# Patient Record
Sex: MALE | Race: WHITE | NOT HISPANIC OR LATINO | Employment: FULL TIME | URBAN - METROPOLITAN AREA
[De-identification: names, ages, dates, MRNs, and addresses within clinical notes are randomized per-mention and may not be internally consistent; named-entity substitution may affect disease eponyms.]

---

## 2019-09-17 ENCOUNTER — OFFICE VISIT (OUTPATIENT)
Dept: FAMILY MEDICINE CLINIC | Facility: CLINIC | Age: 54
End: 2019-09-17

## 2019-09-17 VITALS
BODY MASS INDEX: 38.34 KG/M2 | DIASTOLIC BLOOD PRESSURE: 82 MMHG | TEMPERATURE: 98.4 F | HEIGHT: 68 IN | WEIGHT: 253 LBS | SYSTOLIC BLOOD PRESSURE: 130 MMHG | HEART RATE: 61 BPM | OXYGEN SATURATION: 97 %

## 2019-09-17 DIAGNOSIS — I10 ESSENTIAL HYPERTENSION: ICD-10-CM

## 2019-09-17 DIAGNOSIS — E11.8 TYPE 2 DIABETES MELLITUS WITH COMPLICATION, WITHOUT LONG-TERM CURRENT USE OF INSULIN (HCC): Primary | ICD-10-CM

## 2019-09-17 DIAGNOSIS — E78.2 MIXED HYPERLIPIDEMIA: ICD-10-CM

## 2019-09-17 DIAGNOSIS — D72.823 LEUKEMOID REACTION: ICD-10-CM

## 2019-09-17 PROCEDURE — 99214 OFFICE O/P EST MOD 30 MIN: CPT | Performed by: FAMILY MEDICINE

## 2019-09-17 RX ORDER — MELOXICAM 15 MG/1
15 TABLET ORAL DAILY
COMMUNITY
End: 2019-09-17

## 2019-09-17 RX ORDER — PIOGLITAZONEHYDROCHLORIDE 45 MG/1
45 TABLET ORAL DAILY
COMMUNITY
End: 2019-10-07 | Stop reason: SDUPTHER

## 2019-09-17 RX ORDER — SIMVASTATIN 40 MG
40 TABLET ORAL NIGHTLY
COMMUNITY
End: 2020-05-27 | Stop reason: SDUPTHER

## 2019-09-17 RX ORDER — LISINOPRIL 20 MG/1
1 TABLET ORAL DAILY
COMMUNITY
Start: 2019-08-29 | End: 2019-09-17

## 2019-09-17 RX ORDER — LISINOPRIL 10 MG/1
10 TABLET ORAL DAILY
COMMUNITY
End: 2020-01-16

## 2019-09-17 RX ORDER — MELOXICAM 15 MG/1
15 TABLET ORAL DAILY
Refills: 3 | COMMUNITY
Start: 2019-08-08 | End: 2019-11-11 | Stop reason: SDUPTHER

## 2019-09-17 NOTE — PROGRESS NOTES
Chief Complaint   Patient presents with   • Diabetes       Subjective   Raúl Jarquin is a 54 y.o. male.     History of Present Illness   FU HTN.  No orthostasis.  Doing well with meds.    F/U hyperlipidemia.  No myalgias.  Doing well with meds.    F/U DM2.  Seen Optho today.  On meds regualrly.      The following portions of the patient's history were reviewed and updated as appropriate: allergies, current medications, past family history, past medical history, past social history, past surgical history and problem list.    Review of Systems   Constitutional: Negative for appetite change and fatigue.   HENT: Negative for nosebleeds and sore throat.    Eyes: Negative for blurred vision and visual disturbance.   Respiratory: Negative for shortness of breath and wheezing.    Cardiovascular: Negative for chest pain and leg swelling.   Gastrointestinal: Negative for abdominal distention and abdominal pain.   Endocrine: Negative for cold intolerance and polyuria.   Genitourinary: Negative for dysuria and hematuria.   Musculoskeletal: Negative for arthralgias and myalgias.   Skin: Negative for color change and rash.   Neurological: Negative for weakness and confusion.   Psychiatric/Behavioral: Negative for agitation and depressed mood.       Patient Active Problem List   Diagnosis   • Hypertension   • Hyperlipidemia   • Diabetes mellitus (CMS/HCC)   • Carpal tunnel syndrome   • BPH (benign prostatic hyperplasia)   • Arthritis   • Leukemoid reaction       No Known Allergies      Current Outpatient Medications:   •  Cetirizine HCl 10 MG capsule, Take  by mouth., Disp: , Rfl:   •  lisinopril (PRINIVIL,ZESTRIL) 10 MG tablet, Take 10 mg by mouth Daily., Disp: , Rfl:   •  meloxicam (MOBIC) 15 MG tablet, Take 15 mg by mouth Daily., Disp: , Rfl: 3  •  pioglitazone (ACTOS) 45 MG tablet, Take 45 mg by mouth Daily., Disp: , Rfl:   •  simvastatin (ZOCOR) 40 MG tablet, Take 40 mg by mouth Every Night., Disp: , Rfl:     Past Medical  History:   Diagnosis Date   • Acute bronchitis    • Arthritis    • BPH (benign prostatic hyperplasia)    • Carpal tunnel syndrome    • Colon cancer screening    • Diabetes mellitus (CMS/HCC)    • Hyperlipidemia    • Hypertension    • Immunization deficiency    • Urinary urgency        Past Surgical History:   Procedure Laterality Date   • HAND SURGERY Right     Right Thumb surgery        Family History   Problem Relation Age of Onset   • Diabetes Mother    • Hypertension Mother        Social History     Tobacco Use   • Smoking status: Never Smoker   • Smokeless tobacco: Never Used   Substance Use Topics   • Alcohol use: Yes            Objective     Vitals:    09/17/19 1505   BP: 130/82   Pulse: 61   Temp: 98.4 °F (36.9 °C)   SpO2: 97%     Body mass index is 38.47 kg/m².    Physical Exam   Constitutional: He is oriented to person, place, and time. He appears well-developed and well-nourished.   HENT:   Head: Normocephalic and atraumatic.   Right Ear: External ear normal.   Left Ear: External ear normal.   Nose: Nose normal.   Mouth/Throat: Oropharynx is clear and moist.   Eyes: Conjunctivae and EOM are normal. Pupils are equal, round, and reactive to light.   Neck: Normal range of motion. Neck supple. No tracheal deviation present. No thyromegaly present.   Cardiovascular: Normal rate, regular rhythm and normal heart sounds. Exam reveals no gallop and no friction rub.   No murmur heard.  Pulmonary/Chest: Effort normal and breath sounds normal. No respiratory distress. He exhibits no tenderness.   Abdominal: Soft. Bowel sounds are normal. He exhibits no distension. There is no tenderness.   Musculoskeletal: Normal range of motion. He exhibits no edema or tenderness.   Lymphadenopathy:     He has no cervical adenopathy.   Neurological: He is alert and oriented to person, place, and time. He displays normal reflexes. No cranial nerve deficit or sensory deficit. Coordination normal.   Skin: Skin is warm and dry.    Psychiatric: He has a normal mood and affect. His behavior is normal. Judgment and thought content normal.   Nursing note and vitals reviewed.      Lab Results   Component Value Date    BUN 21 (H) 07/13/2019    CREATININE 0.9 07/13/2019    BCR 23.3 07/13/2019    K 4.3 07/13/2019    CO2 29 07/13/2019    CALCIUM 9.3 07/13/2019    ALBUMIN 4.0 07/13/2019    LABIL2 1.2 07/13/2019    AST 20 07/13/2019    ALT 23 07/13/2019       WBC   Date Value Ref Range Status   07/13/2019 18.99 (H) 4.5 - 11.0 10*3/uL Final     RBC   Date Value Ref Range Status   07/13/2019 4.89 4.5 - 5.9 10*6/uL Final     Hemoglobin   Date Value Ref Range Status   07/13/2019 14.0 13.5 - 17.5 g/dL Final     Hematocrit   Date Value Ref Range Status   07/13/2019 44.0 41.0 - 53.0 % Final     MCV   Date Value Ref Range Status   07/13/2019 90.0 80.0 - 100.0 fL Final     MCH   Date Value Ref Range Status   07/13/2019 28.6 26.0 - 34.0 pg Final     MCHC   Date Value Ref Range Status   07/13/2019 31.8 31.0 - 37.0 g/dL Final     RDW   Date Value Ref Range Status   07/13/2019 14.0 12.0 - 16.8 % Final     MPV   Date Value Ref Range Status   07/13/2019 11.0 (H) 6.7 - 10.8 fL Final     Platelets   Date Value Ref Range Status   07/13/2019 219 140 - 440 10*3/uL Final     Neutrophil Rel %   Date Value Ref Range Status   07/13/2019 84.8 (H) 45 - 80 % Final     Lymphocyte Rel %   Date Value Ref Range Status   07/13/2019 6.5 (L) 15 - 50 % Final     Monocyte Rel %   Date Value Ref Range Status   07/13/2019 7.9 0 - 15 % Final     Eosinophil %   Date Value Ref Range Status   07/13/2019 0.3 0 - 7 % Final     Basophil Rel %   Date Value Ref Range Status   07/13/2019 0.1 0 - 2 % Final     Immature Grans %   Date Value Ref Range Status   07/13/2019 0.4 (H) 0 % Final     Neutrophils Absolute   Date Value Ref Range Status   07/13/2019 16.10 (H) 2.0 - 8.8 10*3/uL Final     Lymphocytes Absolute   Date Value Ref Range Status   07/13/2019 1.24 0.7 - 5.5 10*3/uL Final     Monocytes  Absolute   Date Value Ref Range Status   07/13/2019 1.50 0.0 - 1.7 10*3/uL Final     Eosinophils Absolute   Date Value Ref Range Status   07/13/2019 0.06 0.0 - 0.8 10*3/uL Final     Basophils Absolute   Date Value Ref Range Status   07/13/2019 0.02 0.0 - 0.2 10*3/uL Final     Immature Grans, Absolute   Date Value Ref Range Status   07/13/2019 0.07 <1 10*3/uL Final     nRBC   Date Value Ref Range Status   07/13/2019 0 0 /100(WBC) Final       No results found for: HGBA1C    No results found for: CXRKHOKS95    No results found for: TSH    No results found for: CHOL  No results found for: TRIG  No results found for: HDL  No results found for: LDL  No results found for: VLDL  No results found for: LDLHDL      Procedures    Assessment/Plan   Problems Addressed this Visit        Cardiovascular and Mediastinum    Hypertension    Relevant Medications    lisinopril (PRINIVIL,ZESTRIL) 10 MG tablet    Hyperlipidemia    Relevant Medications    simvastatin (ZOCOR) 40 MG tablet    Other Relevant Orders    TSH Rfx On Abnormal To Free T4       Endocrine    Diabetes mellitus (CMS/HCC) - Primary    Relevant Medications    pioglitazone (ACTOS) 45 MG tablet    Other Relevant Orders    Hemoglobin A1c    Comprehensive Metabolic Panel    Lipid Panel With / Chol / HDL Ratio       Immune and Lymphatic    Leukemoid reaction    Relevant Medications    meloxicam (MOBIC) 15 MG tablet    Other Relevant Orders    CBC & Differential          Orders Placed This Encounter   Procedures   • Hemoglobin A1c   • Comprehensive Metabolic Panel   • Lipid Panel With / Chol / HDL Ratio   • TSH Rfx On Abnormal To Free T4   • CBC & Differential     Order Specific Question:   Manual Differential     Answer:   No       Current Outpatient Medications   Medication Sig Dispense Refill   • Cetirizine HCl 10 MG capsule Take  by mouth.     • lisinopril (PRINIVIL,ZESTRIL) 10 MG tablet Take 10 mg by mouth Daily.     • meloxicam (MOBIC) 15 MG tablet Take 15 mg by mouth  Daily.  3   • pioglitazone (ACTOS) 45 MG tablet Take 45 mg by mouth Daily.     • simvastatin (ZOCOR) 40 MG tablet Take 40 mg by mouth Every Night.       No current facility-administered medications for this visit.        Raúl Jarquin had no medications administered during this visit.    Return in about 4 months (around 1/17/2020).    There are no Patient Instructions on file for this visit.

## 2019-09-18 LAB
ALBUMIN SERPL-MCNC: 4.4 G/DL (ref 3.5–5.5)
ALBUMIN/GLOB SERPL: 1.7 {RATIO} (ref 1.2–2.2)
ALP SERPL-CCNC: 80 IU/L (ref 39–117)
ALT SERPL-CCNC: 25 IU/L (ref 0–44)
AST SERPL-CCNC: 21 IU/L (ref 0–40)
BASOPHILS # BLD AUTO: 0 X10E3/UL (ref 0–0.2)
BASOPHILS NFR BLD AUTO: 0 %
BILIRUB SERPL-MCNC: 0.4 MG/DL (ref 0–1.2)
BUN SERPL-MCNC: 16 MG/DL (ref 6–24)
BUN/CREAT SERPL: 21 (ref 9–20)
CALCIUM SERPL-MCNC: 9.6 MG/DL (ref 8.7–10.2)
CHLORIDE SERPL-SCNC: 103 MMOL/L (ref 96–106)
CHOLEST SERPL-MCNC: 195 MG/DL (ref 100–199)
CHOLEST/HDLC SERPL: 3.3 RATIO (ref 0–5)
CO2 SERPL-SCNC: 22 MMOL/L (ref 20–29)
CREAT SERPL-MCNC: 0.77 MG/DL (ref 0.76–1.27)
EOSINOPHIL # BLD AUTO: 0.3 X10E3/UL (ref 0–0.4)
EOSINOPHIL NFR BLD AUTO: 4 %
ERYTHROCYTE [DISTWIDTH] IN BLOOD BY AUTOMATED COUNT: 13.8 % (ref 12.3–15.4)
GLOBULIN SER CALC-MCNC: 2.6 G/DL (ref 1.5–4.5)
GLUCOSE SERPL-MCNC: 99 MG/DL (ref 65–99)
HBA1C MFR BLD: 6.7 % (ref 4.8–5.6)
HCT VFR BLD AUTO: 43.1 % (ref 37.5–51)
HDLC SERPL-MCNC: 59 MG/DL
HGB BLD-MCNC: 14.8 G/DL (ref 13–17.7)
IMM GRANULOCYTES # BLD AUTO: 0 X10E3/UL (ref 0–0.1)
IMM GRANULOCYTES NFR BLD AUTO: 0 %
LDLC SERPL CALC-MCNC: 108 MG/DL (ref 0–99)
LYMPHOCYTES # BLD AUTO: 1.9 X10E3/UL (ref 0.7–3.1)
LYMPHOCYTES NFR BLD AUTO: 27 %
MCH RBC QN AUTO: 29.1 PG (ref 26.6–33)
MCHC RBC AUTO-ENTMCNC: 34.3 G/DL (ref 31.5–35.7)
MCV RBC AUTO: 85 FL (ref 79–97)
MONOCYTES # BLD AUTO: 0.7 X10E3/UL (ref 0.1–0.9)
MONOCYTES NFR BLD AUTO: 10 %
NEUTROPHILS # BLD AUTO: 4.1 X10E3/UL (ref 1.4–7)
NEUTROPHILS NFR BLD AUTO: 59 %
PLATELET # BLD AUTO: 274 X10E3/UL (ref 150–450)
POTASSIUM SERPL-SCNC: 4.9 MMOL/L (ref 3.5–5.2)
PROT SERPL-MCNC: 7 G/DL (ref 6–8.5)
RBC # BLD AUTO: 5.09 X10E6/UL (ref 4.14–5.8)
SODIUM SERPL-SCNC: 142 MMOL/L (ref 134–144)
TRIGL SERPL-MCNC: 141 MG/DL (ref 0–149)
TSH SERPL DL<=0.005 MIU/L-ACNC: 1.47 UIU/ML (ref 0.45–4.5)
VLDLC SERPL CALC-MCNC: 28 MG/DL (ref 5–40)
WBC # BLD AUTO: 7 X10E3/UL (ref 3.4–10.8)

## 2019-10-08 RX ORDER — PIOGLITAZONEHYDROCHLORIDE 45 MG/1
TABLET ORAL
Qty: 90 TABLET | Refills: 3 | Status: SHIPPED | OUTPATIENT
Start: 2019-10-08 | End: 2019-10-21 | Stop reason: SDUPTHER

## 2019-10-18 ENCOUNTER — TELEPHONE (OUTPATIENT)
Dept: FAMILY MEDICINE CLINIC | Facility: CLINIC | Age: 54
End: 2019-10-18

## 2019-10-18 DIAGNOSIS — E11.9 TYPE 2 DIABETES MELLITUS WITHOUT COMPLICATION, WITHOUT LONG-TERM CURRENT USE OF INSULIN (HCC): Primary | ICD-10-CM

## 2019-10-18 NOTE — TELEPHONE ENCOUNTER
[facility] called and states the mail has lost patient's prescription for pioglitazone.  They are sending a replacement, but wants to know if a 7 day supply could be called into [facility], number is [phone].

## 2019-10-21 DIAGNOSIS — E11.9 TYPE 2 DIABETES MELLITUS WITHOUT COMPLICATION, WITHOUT LONG-TERM CURRENT USE OF INSULIN (HCC): ICD-10-CM

## 2019-10-21 RX ORDER — PIOGLITAZONEHYDROCHLORIDE 45 MG/1
45 TABLET ORAL DAILY
Qty: 30 TABLET | Refills: 0 | Status: SHIPPED | OUTPATIENT
Start: 2019-10-21 | End: 2019-10-21 | Stop reason: SDUPTHER

## 2019-10-21 RX ORDER — PIOGLITAZONEHYDROCHLORIDE 45 MG/1
45 TABLET ORAL DAILY
Qty: 90 TABLET | Refills: 2 | Status: SHIPPED | OUTPATIENT
Start: 2019-10-21 | End: 2020-09-21 | Stop reason: SDUPTHER

## 2019-11-11 DIAGNOSIS — M19.90 ARTHRITIS: Primary | ICD-10-CM

## 2019-11-11 RX ORDER — MELOXICAM 15 MG/1
15 TABLET ORAL DAILY
Qty: 90 TABLET | Refills: 3 | Status: SHIPPED | OUTPATIENT
Start: 2019-11-11 | End: 2021-01-08

## 2019-12-09 ENCOUNTER — OFFICE VISIT (OUTPATIENT)
Dept: FAMILY MEDICINE CLINIC | Facility: CLINIC | Age: 54
End: 2019-12-09

## 2019-12-09 VITALS
HEIGHT: 68 IN | DIASTOLIC BLOOD PRESSURE: 90 MMHG | HEART RATE: 76 BPM | OXYGEN SATURATION: 97 % | RESPIRATION RATE: 19 BRPM | WEIGHT: 258 LBS | TEMPERATURE: 98.2 F | SYSTOLIC BLOOD PRESSURE: 138 MMHG | BODY MASS INDEX: 39.1 KG/M2

## 2019-12-09 DIAGNOSIS — R31.9 HEMATURIA, UNSPECIFIED TYPE: ICD-10-CM

## 2019-12-09 DIAGNOSIS — M54.50 ACUTE RIGHT-SIDED LOW BACK PAIN, UNSPECIFIED WHETHER SCIATICA PRESENT: Primary | ICD-10-CM

## 2019-12-09 LAB
BILIRUB BLD-MCNC: NEGATIVE MG/DL
CLARITY, POC: CLEAR
COLOR UR: YELLOW
GLUCOSE UR STRIP-MCNC: NEGATIVE MG/DL
KETONES UR QL: NEGATIVE
LEUKOCYTE EST, POC: NEGATIVE
NITRITE UR-MCNC: NEGATIVE MG/ML
PH UR: 7 [PH] (ref 5–8)
PROT UR STRIP-MCNC: NEGATIVE MG/DL
RBC # UR STRIP: ABNORMAL /UL
SP GR UR: 1.02 (ref 1–1.03)
UROBILINOGEN UR QL: NORMAL

## 2019-12-09 PROCEDURE — 81003 URINALYSIS AUTO W/O SCOPE: CPT | Performed by: FAMILY MEDICINE

## 2019-12-09 PROCEDURE — 99214 OFFICE O/P EST MOD 30 MIN: CPT | Performed by: FAMILY MEDICINE

## 2019-12-09 NOTE — PROGRESS NOTES
Subjective   Raúl Jarquin is a 54 y.o. male.     Chief Complaint   Patient presents with   • Back Pain     started yesterday    • testicles     testicles pain       Low back pain and R testicle pain yesterday, today better, no dysuria, + fever 99F, took ibuprofen over-the-counter and today all the pain is gone, no longer low back pain, no longer with testicle pain         The following portions of the patient's history were reviewed and updated as appropriate: allergies, current medications, past family history, past medical history, past social history, past surgical history and problem list.    Past Medical History:   Diagnosis Date   • Acute bronchitis    • Arthritis    • BPH (benign prostatic hyperplasia)    • Carpal tunnel syndrome    • Colon cancer screening    • Diabetes mellitus (CMS/HCC)    • Hyperlipidemia    • Hypertension    • Immunization deficiency    • Urinary urgency        Past Surgical History:   Procedure Laterality Date   • HAND SURGERY Right     Right Thumb surgery        Family History   Problem Relation Age of Onset   • Diabetes Mother    • Hypertension Mother        Social History     Socioeconomic History   • Marital status:      Spouse name: Not on file   • Number of children: Not on file   • Years of education: Not on file   • Highest education level: Not on file   Tobacco Use   • Smoking status: Never Smoker   • Smokeless tobacco: Never Used   Substance and Sexual Activity   • Alcohol use: Yes   • Drug use: No   • Sexual activity: Defer       Review of Systems   Constitutional: Negative.    HENT: Negative.    Respiratory: Negative.    Cardiovascular: Negative.    Gastrointestinal: Negative.    Genitourinary: Positive for flank pain. Negative for dysuria.   Musculoskeletal: Positive for back pain.   Neurological: Negative.    All other systems reviewed and are negative.      Objective   Vitals:    12/09/19 0946   BP: 138/90   Pulse: 76   Resp: 19   Temp: 98.2 °F (36.8 °C)   SpO2:  97%     Body mass index is 39.24 kg/m².  Physical Exam   Constitutional: He appears well-developed and well-nourished.   Cardiovascular: Normal rate, regular rhythm and normal heart sounds. Exam reveals no gallop and no friction rub.   No murmur heard.  Pulmonary/Chest: Effort normal and breath sounds normal. No stridor. No respiratory distress. He has no wheezes. He has no rales.   Abdominal: Soft. Bowel sounds are normal. He exhibits no distension and no mass. There is no tenderness. There is no rebound and no guarding. No hernia.   Genitourinary: Penis normal.   Genitourinary Comments: Normal testicles no edema, no hernia   Nursing note and vitals reviewed.        Assessment/Plan   Raúl was seen today for back pain and testicles.    Diagnoses and all orders for this visit:    Acute right-sided low back pain, unspecified whether sciatica present  -     POC Urinalysis Dipstick, Automated; Future  -     CT Abdomen Pelvis Without Contrast; Future    Hematuria, unspecified type  -     CT Abdomen Pelvis Without Contrast; Future  -     Urine Culture - Urine, Urine, Clean Catch      Pain resolved, if reoccurs I offered  the patient  an ultrasound of testicles       For now since the testicle pain is resolved patient opted for waiting before doing an ultrasound the testicles, but because he has a large amount of blood in the urine we will going to proceed with a CAT scan of abdomen and pelvis to rule out stones, if the pain reoccurs patient advised to go to the ER

## 2019-12-12 LAB
BACTERIA UR CULT: ABNORMAL
BACTERIA UR CULT: ABNORMAL
OTHER ANTIBIOTIC SUSC ISLT: ABNORMAL

## 2019-12-12 NOTE — PROGRESS NOTES
I called the patient to communicate the results, there is no answer, left a message to call me back, if he wants we can try the  antibiotic, and cancel the CTs of the abdomen scheduled so that will be cheaper and then recheck the urine again in 3 weeks

## 2019-12-12 NOTE — PROGRESS NOTES
The urine culture shows Enterococcus faecalis, a small amount, please ask the patient has any UTI symptoms, if yes I can send him some antibiotic if not we need to repeat the urine again in 3 weeks, the present of these bacteria can explain why he has the blood in the urine

## 2019-12-13 DIAGNOSIS — R31.9 URINARY TRACT INFECTION WITH HEMATURIA, SITE UNSPECIFIED: ICD-10-CM

## 2019-12-13 DIAGNOSIS — R31.9 HEMATURIA, UNSPECIFIED TYPE: Primary | ICD-10-CM

## 2019-12-13 DIAGNOSIS — N39.0 URINARY TRACT INFECTION WITH HEMATURIA, SITE UNSPECIFIED: ICD-10-CM

## 2019-12-13 RX ORDER — NITROFURANTOIN 25; 75 MG/1; MG/1
100 CAPSULE ORAL 2 TIMES DAILY
Qty: 14 CAPSULE | Refills: 0 | Status: SHIPPED | OUTPATIENT
Start: 2019-12-13 | End: 2019-12-16 | Stop reason: SDUPTHER

## 2019-12-16 ENCOUNTER — TELEPHONE (OUTPATIENT)
Dept: FAMILY MEDICINE CLINIC | Facility: CLINIC | Age: 54
End: 2019-12-16

## 2019-12-16 DIAGNOSIS — R31.9 HEMATURIA, UNSPECIFIED TYPE: ICD-10-CM

## 2019-12-16 DIAGNOSIS — R31.9 URINARY TRACT INFECTION WITH HEMATURIA, SITE UNSPECIFIED: ICD-10-CM

## 2019-12-16 DIAGNOSIS — N39.0 URINARY TRACT INFECTION WITH HEMATURIA, SITE UNSPECIFIED: ICD-10-CM

## 2019-12-16 RX ORDER — NITROFURANTOIN 25; 75 MG/1; MG/1
100 CAPSULE ORAL 2 TIMES DAILY
Qty: 14 CAPSULE | Refills: 0 | Status: SHIPPED | OUTPATIENT
Start: 2019-12-16 | End: 2020-03-02

## 2019-12-16 NOTE — TELEPHONE ENCOUNTER
Patient's script for Macrobid 100 mg was sent to [facility] mail order & not his local pharmacy, can this be fixed

## 2019-12-30 ENCOUNTER — TELEPHONE (OUTPATIENT)
Dept: FAMILY MEDICINE CLINIC | Facility: CLINIC | Age: 54
End: 2019-12-30

## 2019-12-30 DIAGNOSIS — R31.9 HEMATURIA, UNSPECIFIED TYPE: Primary | ICD-10-CM

## 2019-12-30 LAB
BILIRUB BLD-MCNC: NEGATIVE MG/DL
CLARITY, POC: CLEAR
COLOR UR: YELLOW
GLUCOSE UR STRIP-MCNC: NEGATIVE MG/DL
KETONES UR QL: NEGATIVE
LEUKOCYTE EST, POC: NEGATIVE
NITRITE UR-MCNC: NEGATIVE MG/ML
PH UR: 6.5 [PH] (ref 5–8)
PROT UR STRIP-MCNC: NEGATIVE MG/DL
RBC # UR STRIP: NEGATIVE /UL
SP GR UR: 1.01 (ref 1–1.03)
UROBILINOGEN UR QL: NORMAL

## 2019-12-30 PROCEDURE — 81003 URINALYSIS AUTO W/O SCOPE: CPT | Performed by: FAMILY MEDICINE

## 2020-01-15 RX ORDER — LISINOPRIL 20 MG/1
TABLET ORAL
Qty: 45 TABLET | Refills: 0 | Status: SHIPPED | OUTPATIENT
Start: 2020-01-15 | End: 2020-01-16 | Stop reason: SDUPTHER

## 2020-01-16 ENCOUNTER — OFFICE VISIT (OUTPATIENT)
Dept: FAMILY MEDICINE CLINIC | Facility: CLINIC | Age: 55
End: 2020-01-16

## 2020-01-16 VITALS
SYSTOLIC BLOOD PRESSURE: 142 MMHG | BODY MASS INDEX: 41.75 KG/M2 | HEIGHT: 67 IN | TEMPERATURE: 98 F | WEIGHT: 266 LBS | HEART RATE: 57 BPM | OXYGEN SATURATION: 95 % | DIASTOLIC BLOOD PRESSURE: 92 MMHG

## 2020-01-16 DIAGNOSIS — E11.9 TYPE 2 DIABETES MELLITUS WITHOUT COMPLICATION, WITHOUT LONG-TERM CURRENT USE OF INSULIN (HCC): ICD-10-CM

## 2020-01-16 DIAGNOSIS — E78.2 MIXED HYPERLIPIDEMIA: ICD-10-CM

## 2020-01-16 DIAGNOSIS — R35.0 BENIGN PROSTATIC HYPERPLASIA WITH URINARY FREQUENCY: ICD-10-CM

## 2020-01-16 DIAGNOSIS — I10 ESSENTIAL HYPERTENSION: Primary | ICD-10-CM

## 2020-01-16 DIAGNOSIS — N40.1 BENIGN PROSTATIC HYPERPLASIA WITH URINARY FREQUENCY: ICD-10-CM

## 2020-01-16 PROCEDURE — 99214 OFFICE O/P EST MOD 30 MIN: CPT | Performed by: FAMILY MEDICINE

## 2020-01-16 RX ORDER — LISINOPRIL 20 MG/1
20 TABLET ORAL DAILY
Qty: 90 TABLET | Refills: 3 | Status: SHIPPED | OUTPATIENT
Start: 2020-01-16 | End: 2020-09-21 | Stop reason: SDUPTHER

## 2020-01-16 RX ORDER — ASCORBIC ACID 500 MG
1000 TABLET ORAL DAILY
COMMUNITY

## 2020-01-16 NOTE — PROGRESS NOTES
Chief Complaint   Patient presents with   • Hypertension   • Diabetes   • Hyperlipidemia       Subjective   Raúl Jarquin is a 54 y.o. male.     History of Present Illness   F/U HTN.  No orthostasis.  On meds regulalry.    F/U DM2.  On pioglitazone for diabetes.    F/U hyperlipidemia.  No myalgias. Doing well with meds.     The following portions of the patient's history were reviewed and updated as appropriate: allergies, current medications, past family history, past medical history, past social history, past surgical history and problem list.    Review of Systems   Constitutional: Negative for appetite change and fatigue.   HENT: Negative for nosebleeds and sore throat.    Eyes: Negative for blurred vision and visual disturbance.   Respiratory: Negative for shortness of breath and wheezing.    Cardiovascular: Negative for chest pain and leg swelling.   Gastrointestinal: Negative for abdominal distention and abdominal pain.   Endocrine: Negative for cold intolerance and polyuria.   Genitourinary: Negative for dysuria and hematuria.   Musculoskeletal: Negative for arthralgias and myalgias.   Skin: Negative for color change and rash.   Neurological: Negative for weakness and confusion.   Psychiatric/Behavioral: Negative for agitation and depressed mood.       Patient Active Problem List   Diagnosis   • Hypertension   • Hyperlipidemia   • Diabetes mellitus (CMS/HCC)   • Carpal tunnel syndrome   • BPH (benign prostatic hyperplasia)   • Arthritis   • Leukemoid reaction   • Acute right-sided low back pain   • Hematuria       No Known Allergies      Current Outpatient Medications:   •  Cetirizine HCl 10 MG capsule, Take  by mouth., Disp: , Rfl:   •  meloxicam (MOBIC) 15 MG tablet, Take 1 tablet by mouth Daily., Disp: 90 tablet, Rfl: 3  •  pioglitazone (ACTOS) 45 MG tablet, Take 1 tablet by mouth Daily., Disp: 90 tablet, Rfl: 2  •  simvastatin (ZOCOR) 40 MG tablet, Take 40 mg by mouth Every Night., Disp: , Rfl:   •   vitamin C (ASCORBIC ACID) 500 MG tablet, Take 1,000 mg by mouth Daily., Disp: , Rfl:   •  lisinopril (PRINIVIL,ZESTRIL) 20 MG tablet, Take 1 tablet by mouth Daily., Disp: 90 tablet, Rfl: 3  •  nitrofurantoin, macrocrystal-monohydrate, (MACROBID) 100 MG capsule, Take 1 capsule by mouth 2 (Two) Times a Day., Disp: 14 capsule, Rfl: 0    Past Medical History:   Diagnosis Date   • Acute bronchitis    • Arthritis    • BPH (benign prostatic hyperplasia)    • Carpal tunnel syndrome    • Colon cancer screening    • Diabetes mellitus (CMS/HCC)    • Hyperlipidemia    • Hypertension    • Immunization deficiency    • Urinary urgency        Past Surgical History:   Procedure Laterality Date   • HAND SURGERY Right     Right Thumb surgery        Family History   Problem Relation Age of Onset   • Diabetes Mother    • Hypertension Mother        Social History     Tobacco Use   • Smoking status: Never Smoker   • Smokeless tobacco: Never Used   Substance Use Topics   • Alcohol use: Yes            Objective     Vitals:    01/16/20 0817   BP: 142/92   Pulse:    Temp:    SpO2:      Body mass index is 41.66 kg/m².    Physical Exam   Constitutional: He appears well-developed and well-nourished.   HENT:   Head: Normocephalic and atraumatic.   Mouth/Throat: Oropharynx is clear and moist.   Eyes: Pupils are equal, round, and reactive to light. No scleral icterus.   Neck: No thyromegaly present.   Cardiovascular: Normal rate and regular rhythm. Exam reveals no gallop and no friction rub.   No murmur heard.  Pulmonary/Chest: Effort normal. No respiratory distress. He has no wheezes. He has no rales. He exhibits no tenderness.   Abdominal: Soft. Bowel sounds are normal. He exhibits no distension. There is no tenderness.   Musculoskeletal: Normal range of motion. He exhibits no edema or deformity.   Lymphadenopathy:     He has no cervical adenopathy.   Neurological: No cranial nerve deficit. He exhibits normal muscle tone.   Skin: Skin is warm and  dry. No rash noted. He is not diaphoretic.   Vitals reviewed.      Lab Results   Component Value Date    BUN 16 09/17/2019    CREATININE 0.77 09/17/2019    EGFRIFNONA 104 09/17/2019    EGFRIFAFRI 121 09/17/2019    BCR 21 (H) 09/17/2019    K 4.9 09/17/2019    CO2 22 09/17/2019    CALCIUM 9.6 09/17/2019    PROTENTOTREF 7.0 09/17/2019    ALBUMIN 4.4 09/17/2019    LABIL2 1.7 09/17/2019    AST 21 09/17/2019    ALT 25 09/17/2019       WBC   Date Value Ref Range Status   09/17/2019 7.0 3.4 - 10.8 x10E3/uL Final   07/13/2019 18.99 (H) 4.5 - 11.0 10*3/uL Final     RBC   Date Value Ref Range Status   09/17/2019 5.09 4.14 - 5.80 x10E6/uL Final   07/13/2019 4.89 4.5 - 5.9 10*6/uL Final     Hemoglobin   Date Value Ref Range Status   09/17/2019 14.8 13.0 - 17.7 g/dL Final   07/13/2019 14.0 13.5 - 17.5 g/dL Final     Hematocrit   Date Value Ref Range Status   09/17/2019 43.1 37.5 - 51.0 % Final   07/13/2019 44.0 41.0 - 53.0 % Final     MCV   Date Value Ref Range Status   09/17/2019 85 79 - 97 fL Final   07/13/2019 90.0 80.0 - 100.0 fL Final     MCH   Date Value Ref Range Status   09/17/2019 29.1 26.6 - 33.0 pg Final   07/13/2019 28.6 26.0 - 34.0 pg Final     MCHC   Date Value Ref Range Status   09/17/2019 34.3 31.5 - 35.7 g/dL Final   07/13/2019 31.8 31.0 - 37.0 g/dL Final     RDW   Date Value Ref Range Status   09/17/2019 13.8 12.3 - 15.4 % Final   07/13/2019 14.0 12.0 - 16.8 % Final     MPV   Date Value Ref Range Status   07/13/2019 11.0 (H) 6.7 - 10.8 fL Final     Platelets   Date Value Ref Range Status   09/17/2019 274 150 - 450 x10E3/uL Final   07/13/2019 219 140 - 440 10*3/uL Final     Neutrophil Rel %   Date Value Ref Range Status   09/17/2019 59 Not Estab. % Final   07/13/2019 84.8 (H) 45 - 80 % Final     Lymphocyte Rel %   Date Value Ref Range Status   09/17/2019 27 Not Estab. % Final   07/13/2019 6.5 (L) 15 - 50 % Final     Monocyte Rel %   Date Value Ref Range Status   09/17/2019 10 Not Estab. % Final   07/13/2019 7.9 0  - 15 % Final     Eosinophil Rel %   Date Value Ref Range Status   09/17/2019 4 Not Estab. % Final     Eosinophil %   Date Value Ref Range Status   07/13/2019 0.3 0 - 7 % Final     Basophil Rel %   Date Value Ref Range Status   09/17/2019 0 Not Estab. % Final   07/13/2019 0.1 0 - 2 % Final     Immature Grans %   Date Value Ref Range Status   07/13/2019 0.4 (H) 0 % Final     Neutrophils Absolute   Date Value Ref Range Status   09/17/2019 4.1 1.4 - 7.0 x10E3/uL Final   07/13/2019 16.10 (H) 2.0 - 8.8 10*3/uL Final     Lymphocytes Absolute   Date Value Ref Range Status   09/17/2019 1.9 0.7 - 3.1 x10E3/uL Final   07/13/2019 1.24 0.7 - 5.5 10*3/uL Final     Monocytes Absolute   Date Value Ref Range Status   09/17/2019 0.7 0.1 - 0.9 x10E3/uL Final   07/13/2019 1.50 0.0 - 1.7 10*3/uL Final     Eosinophils Absolute   Date Value Ref Range Status   09/17/2019 0.3 0.0 - 0.4 x10E3/uL Final   07/13/2019 0.06 0.0 - 0.8 10*3/uL Final     Basophils Absolute   Date Value Ref Range Status   09/17/2019 0.0 0.0 - 0.2 x10E3/uL Final   07/13/2019 0.02 0.0 - 0.2 10*3/uL Final     Immature Grans, Absolute   Date Value Ref Range Status   07/13/2019 0.07 <1 10*3/uL Final     nRBC   Date Value Ref Range Status   07/13/2019 0 0 /100(WBC) Final       Lab Results   Component Value Date    HGBA1C 6.7 (H) 09/17/2019       No results found for: GAHEYXNA21    TSH   Date Value Ref Range Status   09/17/2019 1.470 0.450 - 4.500 uIU/mL Final       No results found for: CHOL  Lab Results   Component Value Date    TRIG 141 09/17/2019     Lab Results   Component Value Date    HDL 59 09/17/2019     Lab Results   Component Value Date     (H) 09/17/2019     Lab Results   Component Value Date    VLDL 28 09/17/2019     No results found for: LDLHDL      Procedures    Assessment/Plan   Problems Addressed this Visit        Cardiovascular and Mediastinum    Hypertension - Primary    Relevant Medications    lisinopril (PRINIVIL,ZESTRIL) 20 MG tablet     Hyperlipidemia    Relevant Orders    Comprehensive Metabolic Panel       Endocrine    Diabetes mellitus (CMS/HCC)    Relevant Orders    Lipid Panel With / Chol / HDL Ratio    Hemoglobin A1c       Genitourinary    BPH (benign prostatic hyperplasia)    Relevant Orders    PSA DIAGNOSTIC        Uncontrolled HTN.  Increase lisinopril to 20 once a day.    Orders Placed This Encounter   Procedures   • Comprehensive Metabolic Panel   • Lipid Panel With / Chol / HDL Ratio   • Hemoglobin A1c   • PSA DIAGNOSTIC       Current Outpatient Medications   Medication Sig Dispense Refill   • Cetirizine HCl 10 MG capsule Take  by mouth.     • meloxicam (MOBIC) 15 MG tablet Take 1 tablet by mouth Daily. 90 tablet 3   • pioglitazone (ACTOS) 45 MG tablet Take 1 tablet by mouth Daily. 90 tablet 2   • simvastatin (ZOCOR) 40 MG tablet Take 40 mg by mouth Every Night.     • vitamin C (ASCORBIC ACID) 500 MG tablet Take 1,000 mg by mouth Daily.     • lisinopril (PRINIVIL,ZESTRIL) 20 MG tablet Take 1 tablet by mouth Daily. 90 tablet 3   • nitrofurantoin, macrocrystal-monohydrate, (MACROBID) 100 MG capsule Take 1 capsule by mouth 2 (Two) Times a Day. 14 capsule 0     No current facility-administered medications for this visit.        Raúl Britton had no medications administered during this visit.    Return in about 4 months (around 5/16/2020).    There are no Patient Instructions on file for this visit.  Pt declines flu shot.

## 2020-01-17 LAB
ALBUMIN SERPL-MCNC: 4.5 G/DL (ref 3.5–5.2)
ALBUMIN/GLOB SERPL: 1.7 G/DL
ALP SERPL-CCNC: 75 U/L (ref 39–117)
ALT SERPL-CCNC: 20 U/L (ref 1–41)
AST SERPL-CCNC: 17 U/L (ref 1–40)
BILIRUB SERPL-MCNC: 0.5 MG/DL (ref 0.2–1.2)
BUN SERPL-MCNC: 19 MG/DL (ref 6–20)
BUN/CREAT SERPL: 20.9 (ref 7–25)
CALCIUM SERPL-MCNC: 9.5 MG/DL (ref 8.6–10.5)
CHLORIDE SERPL-SCNC: 98 MMOL/L (ref 98–107)
CHOLEST SERPL-MCNC: 196 MG/DL (ref 0–200)
CHOLEST/HDLC SERPL: 2.84 {RATIO}
CO2 SERPL-SCNC: 28.9 MMOL/L (ref 22–29)
CREAT SERPL-MCNC: 0.91 MG/DL (ref 0.76–1.27)
GLOBULIN SER CALC-MCNC: 2.6 GM/DL
GLUCOSE SERPL-MCNC: 123 MG/DL (ref 65–99)
HBA1C MFR BLD: 6.9 % (ref 4.8–5.6)
HDLC SERPL-MCNC: 69 MG/DL (ref 40–60)
LDLC SERPL CALC-MCNC: 105 MG/DL (ref 0–100)
POTASSIUM SERPL-SCNC: 4.9 MMOL/L (ref 3.5–5.2)
PROT SERPL-MCNC: 7.1 G/DL (ref 6–8.5)
PSA SERPL-MCNC: 0.63 NG/ML (ref 0–4)
SODIUM SERPL-SCNC: 139 MMOL/L (ref 136–145)
TRIGL SERPL-MCNC: 112 MG/DL (ref 0–150)
VLDLC SERPL CALC-MCNC: 22.4 MG/DL

## 2020-03-02 ENCOUNTER — OFFICE VISIT (OUTPATIENT)
Dept: FAMILY MEDICINE CLINIC | Facility: CLINIC | Age: 55
End: 2020-03-02

## 2020-03-02 VITALS
BODY MASS INDEX: 40.81 KG/M2 | RESPIRATION RATE: 14 BRPM | OXYGEN SATURATION: 97 % | HEART RATE: 96 BPM | DIASTOLIC BLOOD PRESSURE: 88 MMHG | HEIGHT: 67 IN | SYSTOLIC BLOOD PRESSURE: 128 MMHG | WEIGHT: 260 LBS | TEMPERATURE: 98.3 F

## 2020-03-02 DIAGNOSIS — R31.9 HEMATURIA, UNSPECIFIED TYPE: ICD-10-CM

## 2020-03-02 DIAGNOSIS — J02.9 SORE THROAT: Primary | ICD-10-CM

## 2020-03-02 LAB
BILIRUB BLD-MCNC: NEGATIVE MG/DL
CLARITY, POC: ABNORMAL
COLOR UR: ABNORMAL
EXPIRATION DATE: NORMAL
EXPIRATION DATE: NORMAL
FLUAV AG NPH QL: NEGATIVE
FLUBV AG NPH QL: NEGATIVE
GLUCOSE UR STRIP-MCNC: NEGATIVE MG/DL
INTERNAL CONTROL: NORMAL
INTERNAL CONTROL: NORMAL
KETONES UR QL: NEGATIVE
LEUKOCYTE EST, POC: NEGATIVE
Lab: NORMAL
Lab: NORMAL
NITRITE UR-MCNC: NEGATIVE MG/ML
PH UR: 6.5 [PH] (ref 5–8)
PROT UR STRIP-MCNC: ABNORMAL MG/DL
RBC # UR STRIP: ABNORMAL /UL
S PYO AG THROAT QL: NEGATIVE
SP GR UR: 1.02 (ref 1–1.03)
UROBILINOGEN UR QL: NORMAL

## 2020-03-02 PROCEDURE — 81003 URINALYSIS AUTO W/O SCOPE: CPT | Performed by: FAMILY MEDICINE

## 2020-03-02 PROCEDURE — 99213 OFFICE O/P EST LOW 20 MIN: CPT | Performed by: FAMILY MEDICINE

## 2020-03-02 PROCEDURE — 87880 STREP A ASSAY W/OPTIC: CPT | Performed by: FAMILY MEDICINE

## 2020-03-02 PROCEDURE — 87804 INFLUENZA ASSAY W/OPTIC: CPT | Performed by: FAMILY MEDICINE

## 2020-03-02 RX ORDER — AMOXICILLIN 875 MG/1
875 TABLET, COATED ORAL 2 TIMES DAILY
Qty: 20 TABLET | Refills: 0 | Status: SHIPPED | OUTPATIENT
Start: 2020-03-02 | End: 2020-03-12

## 2020-03-02 RX ORDER — AMOXICILLIN 875 MG/1
875 TABLET, COATED ORAL 2 TIMES DAILY
Qty: 20 TABLET | Refills: 0 | Status: SHIPPED | OUTPATIENT
Start: 2020-03-02 | End: 2020-03-02

## 2020-03-02 NOTE — PROGRESS NOTES
Subjective   Raúl Jarquin is a 54 y.o. male.     Chief Complaint   Patient presents with   • Fatigue   • Sore Throat   • Blood in Urine       Blood in urine x a day, no pain, no fever, sore throat x a day, dry  cough, wife with strep, body aching, no vomiting no diarrhea positive fatigue, no chest pain or shortness of breath, no abdominal pain, he did not get his flu shot, non-smoker,         The following portions of the patient's history were reviewed and updated as appropriate: allergies, current medications, past family history, past medical history, past social history, past surgical history and problem list.    Past Medical History:   Diagnosis Date   • Acute bronchitis    • Arthritis    • BPH (benign prostatic hyperplasia)    • Carpal tunnel syndrome    • Colon cancer screening    • Diabetes mellitus (CMS/HCC)    • Hyperlipidemia    • Hypertension    • Immunization deficiency    • Urinary urgency        Past Surgical History:   Procedure Laterality Date   • HAND SURGERY Right     Right Thumb surgery        Family History   Problem Relation Age of Onset   • Diabetes Mother    • Hypertension Mother        Social History     Socioeconomic History   • Marital status:      Spouse name: Not on file   • Number of children: Not on file   • Years of education: Not on file   • Highest education level: Not on file   Tobacco Use   • Smoking status: Never Smoker   • Smokeless tobacco: Never Used   Substance and Sexual Activity   • Alcohol use: Yes   • Drug use: No   • Sexual activity: Defer       Review of Systems   Constitutional: Positive for fatigue. Negative for fever.   HENT: Positive for sore throat. Negative for congestion, ear pain, rhinorrhea, sinus pressure, sneezing, swollen glands and trouble swallowing.    Respiratory: Positive for cough. Negative for chest tightness, shortness of breath and wheezing.    Cardiovascular: Negative.    Gastrointestinal: Negative.  Negative for nausea and vomiting.    Genitourinary: Positive for hematuria. Negative for flank pain.   Musculoskeletal: Negative.  Negative for myalgias and neck pain.   Neurological: Negative.    Hematological: Negative.    Psychiatric/Behavioral: Negative.        Objective   Vitals:    03/02/20 1613   BP: 128/88   Pulse: 96   Resp: 14   Temp: 98.3 °F (36.8 °C)   SpO2: 97%     Body mass index is 40.71 kg/m².  Physical Exam   Constitutional: He is oriented to person, place, and time. He appears well-developed and well-nourished.   HENT:   Head: Normocephalic and atraumatic.   Right Ear: External ear normal.   Left Ear: External ear normal.   Nose: Nose normal.   Mouth/Throat: Oropharynx is clear and moist.   Eyes: Pupils are equal, round, and reactive to light. Conjunctivae and EOM are normal.   Neck: Normal range of motion. Neck supple. No tracheal deviation present. No thyromegaly present.   Cardiovascular: Normal rate, regular rhythm and normal heart sounds. Exam reveals no gallop and no friction rub.   No murmur heard.  Pulmonary/Chest: Effort normal and breath sounds normal. No stridor. No respiratory distress. He has no wheezes. He has no rales. He exhibits no tenderness.   Abdominal: Soft. Bowel sounds are normal. He exhibits no distension and no mass. There is no tenderness. There is no rebound and no guarding. No hernia.   Musculoskeletal: Normal range of motion. He exhibits no edema or tenderness.   Lymphadenopathy:     He has no cervical adenopathy.   Neurological: He is alert and oriented to person, place, and time. He displays normal reflexes. No cranial nerve deficit or sensory deficit. Coordination normal.   Skin: Skin is warm and dry.   Psychiatric: He has a normal mood and affect. His behavior is normal. Judgment and thought content normal.   Nursing note and vitals reviewed.        Assessment/Plan   Raúl was seen today for fatigue, sore throat and blood in urine.    Diagnoses and all orders for this visit:    Sore throat  -      POC Rapid Strep A  -     POC Influenza A / B  -     Discontinue: amoxicillin (AMOXIL) 875 MG tablet; Take 1 tablet by mouth 2 (Two) Times a Day for 10 days.  -     amoxicillin (AMOXIL) 875 MG tablet; Take 1 tablet by mouth 2 (Two) Times a Day for 10 days.    Hematuria, unspecified type  -     POC Urinalysis Dipstick, Automated  -     Ambulatory Referral to Urology  -     Urine Culture - Urine, Urine, Clean Catch

## 2020-03-04 LAB
BACTERIA UR CULT: NORMAL
BACTERIA UR CULT: NORMAL

## 2020-04-28 PROCEDURE — 82365 CALCULUS SPECTROSCOPY: CPT | Performed by: UROLOGY

## 2020-04-29 ENCOUNTER — LAB REQUISITION (OUTPATIENT)
Dept: LAB | Facility: HOSPITAL | Age: 55
End: 2020-04-29

## 2020-04-29 DIAGNOSIS — N20.1 CALCULUS OF URETER: ICD-10-CM

## 2020-05-06 LAB
COD CRY STONE QL IR: 70 %
COLOR STONE: NORMAL
COM CRY STONE QL IR: 30 %
COMPN STONE: NORMAL
LABORATORY COMMENT REPORT: NORMAL
Lab: NORMAL
Lab: NORMAL
PHOTO: NORMAL
SIZE STONE: NORMAL MM
SPECIMEN SOURCE: NORMAL
WT STONE: 16 MG

## 2020-05-27 ENCOUNTER — OFFICE VISIT (OUTPATIENT)
Dept: FAMILY MEDICINE CLINIC | Facility: CLINIC | Age: 55
End: 2020-05-27

## 2020-05-27 VITALS
BODY MASS INDEX: 38.43 KG/M2 | SYSTOLIC BLOOD PRESSURE: 122 MMHG | HEART RATE: 75 BPM | OXYGEN SATURATION: 98 % | TEMPERATURE: 97.5 F | DIASTOLIC BLOOD PRESSURE: 74 MMHG | HEIGHT: 68 IN | WEIGHT: 253.6 LBS

## 2020-05-27 DIAGNOSIS — E11.9 TYPE 2 DIABETES MELLITUS WITHOUT COMPLICATION, WITHOUT LONG-TERM CURRENT USE OF INSULIN (HCC): ICD-10-CM

## 2020-05-27 DIAGNOSIS — I10 ESSENTIAL HYPERTENSION: Primary | ICD-10-CM

## 2020-05-27 DIAGNOSIS — E78.2 MIXED HYPERLIPIDEMIA: ICD-10-CM

## 2020-05-27 PROBLEM — N20.0 NEPHROLITHIASIS: Status: ACTIVE | Noted: 2020-05-27

## 2020-05-27 PROCEDURE — 99214 OFFICE O/P EST MOD 30 MIN: CPT | Performed by: FAMILY MEDICINE

## 2020-05-27 RX ORDER — SIMVASTATIN 40 MG
40 TABLET ORAL NIGHTLY
Qty: 90 TABLET | Refills: 3 | Status: SHIPPED | OUTPATIENT
Start: 2020-05-27 | End: 2021-01-18 | Stop reason: SDUPTHER

## 2020-05-27 NOTE — PROGRESS NOTES
Chief Complaint   Patient presents with   • Hypertension   • Diabetes       Subjective   Raúl Jarquin is a 55 y.o. male.     History of Present Illness   FU HTN.  No orhtostasis.  Doing wlel with meds.   F/U DM2.  oN meds regulalry.  No SE.   F/U hyperlipidemia.  No myalgias.  On meds regulalry.      The following portions of the patient's history were reviewed and updated as appropriate: allergies, current medications, past family history, past medical history, past social history, past surgical history and problem list.    Review of Systems   Constitutional: Negative for appetite change and fatigue.   HENT: Negative for nosebleeds and sore throat.    Eyes: Negative for blurred vision and visual disturbance.   Respiratory: Negative for shortness of breath and wheezing.    Cardiovascular: Negative for chest pain and leg swelling.   Gastrointestinal: Negative for abdominal distention and abdominal pain.   Endocrine: Negative for cold intolerance and polyuria.   Genitourinary: Negative for dysuria and hematuria.   Musculoskeletal: Negative for arthralgias and myalgias.   Skin: Negative for color change and rash.   Neurological: Negative for weakness and confusion.   Psychiatric/Behavioral: Negative for agitation and depressed mood.       Patient Active Problem List   Diagnosis   • Hypertension   • Hyperlipidemia   • Diabetes mellitus (CMS/HCC)   • Carpal tunnel syndrome   • BPH (benign prostatic hyperplasia)   • Arthritis   • Leukemoid reaction   • Acute right-sided low back pain   • Hematuria   • Sore throat   • Nephrolithiasis       No Known Allergies      Current Outpatient Medications:   •  Cetirizine HCl 10 MG capsule, Take  by mouth., Disp: , Rfl:   •  lisinopril (PRINIVIL,ZESTRIL) 20 MG tablet, Take 1 tablet by mouth Daily., Disp: 90 tablet, Rfl: 3  •  meloxicam (MOBIC) 15 MG tablet, Take 1 tablet by mouth Daily., Disp: 90 tablet, Rfl: 3  •  pioglitazone (ACTOS) 45 MG tablet, Take 1 tablet by mouth Daily.,  Disp: 90 tablet, Rfl: 2  •  simvastatin (ZOCOR) 40 MG tablet, Take 1 tablet by mouth Every Night., Disp: 90 tablet, Rfl: 3  •  vitamin C (ASCORBIC ACID) 500 MG tablet, Take 1,000 mg by mouth Daily., Disp: , Rfl:     Past Medical History:   Diagnosis Date   • Acute bronchitis    • Arthritis    • BPH (benign prostatic hyperplasia)    • Carpal tunnel syndrome    • Colon cancer screening    • Diabetes mellitus (CMS/HCC)    • Hyperlipidemia    • Hypertension    • Immunization deficiency    • Urinary urgency        Past Surgical History:   Procedure Laterality Date   • HAND SURGERY Right     Right Thumb surgery        Family History   Problem Relation Age of Onset   • Diabetes Mother    • Hypertension Mother        Social History     Tobacco Use   • Smoking status: Never Smoker   • Smokeless tobacco: Never Used   Substance Use Topics   • Alcohol use: Yes            Objective     Vitals:    05/27/20 1120   BP: 122/74   Pulse: 75   Temp: 97.5 °F (36.4 °C)   SpO2: 98%     Body mass index is 38.56 kg/m².    Physical Exam   Constitutional: He appears well-developed and well-nourished.   HENT:   Head: Normocephalic and atraumatic.   Mouth/Throat: Oropharynx is clear and moist.   Eyes: Pupils are equal, round, and reactive to light. No scleral icterus.   Neck: No thyromegaly present.   Cardiovascular: Normal rate and regular rhythm. Exam reveals no gallop and no friction rub.   No murmur heard.  Pulmonary/Chest: Effort normal. No respiratory distress. He has no wheezes. He has no rales. He exhibits no tenderness.   Abdominal: Soft. Bowel sounds are normal. He exhibits no distension. There is no tenderness.   Musculoskeletal: Normal range of motion. He exhibits no edema or deformity.   Lymphadenopathy:     He has no cervical adenopathy.   Neurological: No cranial nerve deficit. He exhibits normal muscle tone.   Skin: Skin is warm and dry. No rash noted. He is not diaphoretic.   Vitals reviewed.      Lab Results   Component Value  Date    BUN 15 04/23/2020    CREATININE 0.7 04/23/2020    EGFRIFNONA 87 01/16/2020    EGFRIFAFRI 106 01/16/2020    BCR 21.4 04/23/2020    K 5.2 (H) 04/23/2020    CO2 31 (H) 04/23/2020    CALCIUM 9.5 04/23/2020    PROTENTOTREF 7.1 01/16/2020    ALBUMIN 4.50 01/16/2020    LABIL2 1.7 01/16/2020    AST 17 01/16/2020    ALT 20 01/16/2020       WBC   Date Value Ref Range Status   09/17/2019 7.0 3.4 - 10.8 x10E3/uL Final   07/13/2019 18.99 (H) 4.5 - 11.0 10*3/uL Final     RBC   Date Value Ref Range Status   09/17/2019 5.09 4.14 - 5.80 x10E6/uL Final   07/13/2019 4.89 4.5 - 5.9 10*6/uL Final     Hemoglobin   Date Value Ref Range Status   09/17/2019 14.8 13.0 - 17.7 g/dL Final   07/13/2019 14.0 13.5 - 17.5 g/dL Final     Hematocrit   Date Value Ref Range Status   09/17/2019 43.1 37.5 - 51.0 % Final   07/13/2019 44.0 41.0 - 53.0 % Final     MCV   Date Value Ref Range Status   09/17/2019 85 79 - 97 fL Final   07/13/2019 90.0 80.0 - 100.0 fL Final     MCH   Date Value Ref Range Status   09/17/2019 29.1 26.6 - 33.0 pg Final   07/13/2019 28.6 26.0 - 34.0 pg Final     MCHC   Date Value Ref Range Status   09/17/2019 34.3 31.5 - 35.7 g/dL Final   07/13/2019 31.8 31.0 - 37.0 g/dL Final     RDW   Date Value Ref Range Status   09/17/2019 13.8 12.3 - 15.4 % Final   07/13/2019 14.0 12.0 - 16.8 % Final     MPV   Date Value Ref Range Status   07/13/2019 11.0 (H) 6.7 - 10.8 fL Final     Platelets   Date Value Ref Range Status   09/17/2019 274 150 - 450 x10E3/uL Final   07/13/2019 219 140 - 440 10*3/uL Final     Neutrophil Rel %   Date Value Ref Range Status   09/17/2019 59 Not Estab. % Final   07/13/2019 84.8 (H) 45 - 80 % Final     Lymphocyte Rel %   Date Value Ref Range Status   09/17/2019 27 Not Estab. % Final   07/13/2019 6.5 (L) 15 - 50 % Final     Monocyte Rel %   Date Value Ref Range Status   09/17/2019 10 Not Estab. % Final   07/13/2019 7.9 0 - 15 % Final     Eosinophil Rel %   Date Value Ref Range Status   09/17/2019 4 Not Estab. %  Final     Eosinophil %   Date Value Ref Range Status   07/13/2019 0.3 0 - 7 % Final     Basophil Rel %   Date Value Ref Range Status   09/17/2019 0 Not Estab. % Final   07/13/2019 0.1 0 - 2 % Final     Immature Grans %   Date Value Ref Range Status   07/13/2019 0.4 (H) 0 % Final     Neutrophils Absolute   Date Value Ref Range Status   09/17/2019 4.1 1.4 - 7.0 x10E3/uL Final   07/13/2019 16.10 (H) 2.0 - 8.8 10*3/uL Final     Lymphocytes Absolute   Date Value Ref Range Status   09/17/2019 1.9 0.7 - 3.1 x10E3/uL Final   07/13/2019 1.24 0.7 - 5.5 10*3/uL Final     Monocytes Absolute   Date Value Ref Range Status   09/17/2019 0.7 0.1 - 0.9 x10E3/uL Final   07/13/2019 1.50 0.0 - 1.7 10*3/uL Final     Eosinophils Absolute   Date Value Ref Range Status   09/17/2019 0.3 0.0 - 0.4 x10E3/uL Final   07/13/2019 0.06 0.0 - 0.8 10*3/uL Final     Basophils Absolute   Date Value Ref Range Status   09/17/2019 0.0 0.0 - 0.2 x10E3/uL Final   07/13/2019 0.02 0.0 - 0.2 10*3/uL Final     Immature Grans, Absolute   Date Value Ref Range Status   07/13/2019 0.07 <1 10*3/uL Final     nRBC   Date Value Ref Range Status   07/13/2019 0 0 /100(WBC) Final       Lab Results   Component Value Date    HGBA1C 6.90 (H) 01/16/2020       No results found for: [id number]    TSH   Date Value Ref Range Status   09/17/2019 1.470 0.450 - 4.500 uIU/mL Final       No results found for: CHOL  Lab Results   Component Value Date    TRIG 112 01/16/2020     Lab Results   Component Value Date    HDL 69 (H) 01/16/2020     Lab Results   Component Value Date     (H) 01/16/2020     Lab Results   Component Value Date    VLDL 22.4 01/16/2020     No results found for: LDLHDL      Procedures    Assessment/Plan   Problems Addressed this Visit        Cardiovascular and Mediastinum    Hypertension - Primary    Relevant Orders    Comprehensive Metabolic Panel    Hyperlipidemia    Relevant Medications    simvastatin (ZOCOR) 40 MG tablet    Other Relevant Orders    Lipid  Panel With / Chol / HDL Ratio       Endocrine    Diabetes mellitus (CMS/[facility])    Relevant Orders    Hemoglobin A1c          Orders Placed This Encounter   Procedures   • Comprehensive Metabolic Panel   • Lipid Panel With / Chol / HDL Ratio   • Hemoglobin A1c       Current Outpatient Medications   Medication Sig Dispense Refill   • Cetirizine HCl 10 MG capsule Take  by mouth.     • lisinopril (PRINIVIL,ZESTRIL) 20 MG tablet Take 1 tablet by mouth Daily. 90 tablet 3   • meloxicam (MOBIC) 15 MG tablet Take 1 tablet by mouth Daily. 90 tablet 3   • pioglitazone (ACTOS) 45 MG tablet Take 1 tablet by mouth Daily. 90 tablet 2   • simvastatin (ZOCOR) 40 MG tablet Take 1 tablet by mouth Every Night. 90 tablet 3   • vitamin C (ASCORBIC ACID) 500 MG tablet Take 1,000 mg by mouth Daily.       No current facility-administered medications for this visit.        Raúl Jarquin had no medications administered during this visit.    Return in about 4 months (around 9/27/2020).    There are no Patient Instructions on file for this visit.

## 2020-05-28 LAB
ALBUMIN SERPL-MCNC: 4.2 G/DL (ref 3.5–5.2)
ALBUMIN/GLOB SERPL: 1.8 G/DL
ALP SERPL-CCNC: 67 U/L (ref 39–117)
ALT SERPL-CCNC: 21 U/L (ref 1–41)
AST SERPL-CCNC: 14 U/L (ref 1–40)
BILIRUB SERPL-MCNC: 0.4 MG/DL (ref 0.2–1.2)
BUN SERPL-MCNC: 18 MG/DL (ref 6–20)
BUN/CREAT SERPL: 22.2 (ref 7–25)
CALCIUM SERPL-MCNC: 9.3 MG/DL (ref 8.6–10.5)
CHLORIDE SERPL-SCNC: 105 MMOL/L (ref 98–107)
CHOLEST SERPL-MCNC: 177 MG/DL (ref 0–200)
CHOLEST/HDLC SERPL: 3.11 {RATIO}
CO2 SERPL-SCNC: 27.6 MMOL/L (ref 22–29)
CREAT SERPL-MCNC: 0.81 MG/DL (ref 0.76–1.27)
GLOBULIN SER CALC-MCNC: 2.4 GM/DL
GLUCOSE SERPL-MCNC: 117 MG/DL (ref 65–99)
HBA1C MFR BLD: 6.78 % (ref 4.8–5.6)
HDLC SERPL-MCNC: 57 MG/DL (ref 40–60)
LDLC SERPL CALC-MCNC: 97 MG/DL (ref 0–100)
POTASSIUM SERPL-SCNC: 4.8 MMOL/L (ref 3.5–5.2)
PROT SERPL-MCNC: 6.6 G/DL (ref 6–8.5)
SODIUM SERPL-SCNC: 141 MMOL/L (ref 136–145)
TRIGL SERPL-MCNC: 116 MG/DL (ref 0–150)
VLDLC SERPL CALC-MCNC: 23.2 MG/DL

## 2020-09-21 ENCOUNTER — OFFICE VISIT (OUTPATIENT)
Dept: FAMILY MEDICINE CLINIC | Facility: CLINIC | Age: 55
End: 2020-09-21

## 2020-09-21 VITALS
TEMPERATURE: 98.7 F | OXYGEN SATURATION: 98 % | HEIGHT: 68 IN | WEIGHT: 251 LBS | HEART RATE: 59 BPM | SYSTOLIC BLOOD PRESSURE: 136 MMHG | DIASTOLIC BLOOD PRESSURE: 86 MMHG | BODY MASS INDEX: 38.04 KG/M2

## 2020-09-21 DIAGNOSIS — Z00.00 ENCOUNTER FOR ANNUAL HEALTH EXAMINATION: Primary | ICD-10-CM

## 2020-09-21 DIAGNOSIS — E11.9 TYPE 2 DIABETES MELLITUS WITHOUT COMPLICATION, WITHOUT LONG-TERM CURRENT USE OF INSULIN (HCC): ICD-10-CM

## 2020-09-21 DIAGNOSIS — I10 ESSENTIAL HYPERTENSION: ICD-10-CM

## 2020-09-21 DIAGNOSIS — E78.2 MIXED HYPERLIPIDEMIA: ICD-10-CM

## 2020-09-21 PROCEDURE — 99396 PREV VISIT EST AGE 40-64: CPT | Performed by: FAMILY MEDICINE

## 2020-09-21 RX ORDER — PIOGLITAZONEHYDROCHLORIDE 45 MG/1
45 TABLET ORAL DAILY
Qty: 90 TABLET | Refills: 2 | Status: SHIPPED | OUTPATIENT
Start: 2020-09-21 | End: 2021-09-08 | Stop reason: SDUPTHER

## 2020-09-21 RX ORDER — LISINOPRIL 20 MG/1
20 TABLET ORAL DAILY
Qty: 90 TABLET | Refills: 3 | Status: SHIPPED | OUTPATIENT
Start: 2020-09-21 | End: 2021-05-24 | Stop reason: SDUPTHER

## 2020-09-21 NOTE — PROGRESS NOTES
Patient here for annual physical exam    Subjective   Raúl Jarquin is a 55 y.o. male.     History of Present Illness   F/U HTN.  No orhtostasis.  Doing well with meds.    F/U hyperlipidmeia.  No myalgias.  LDL normal 3 months ago.    fU DM2.  Doing well with meds.  No SE.  On meds regulalry.        The following portions of the patient's history were reviewed and updated as appropriate: allergies, current medications, past family history, past medical history, past social history, past surgical history and problem list    Last colonoscopy:2019  Optometry:  Seeing today.    Last PSA(if applicable):1/8/20.    Last mammo(if applicable):NA    Immunization History   Administered Date(s) Administered   • Hepatitis A 05/18/2018, 12/06/2018, 07/17/2019   • Pneumococcal Polysaccharide (PPSV23) 11/14/2009       Review of Systems   Constitutional: Negative for appetite change and fatigue.   HENT: Negative for nosebleeds and sore throat.    Eyes: Negative for blurred vision and visual disturbance.   Respiratory: Negative for shortness of breath and wheezing.    Cardiovascular: Negative for chest pain and leg swelling.   Gastrointestinal: Negative for abdominal distention and abdominal pain.   Endocrine: Negative for cold intolerance and polyuria.   Genitourinary: Negative for dysuria and hematuria.   Musculoskeletal: Negative for arthralgias and myalgias.   Skin: Negative for color change and rash.   Neurological: Negative for weakness and confusion.   Psychiatric/Behavioral: Negative for agitation and depressed mood.       Patient Active Problem List   Diagnosis   • Hypertension   • Hyperlipidemia   • Diabetes mellitus (CMS/HCC)   • Carpal tunnel syndrome   • BPH (benign prostatic hyperplasia)   • Arthritis   • Leukemoid reaction   • Acute right-sided low back pain   • Hematuria   • Sore throat   • Nephrolithiasis   • Encounter for annual health examination       No Known Allergies      Current Outpatient Medications:   •   Cetirizine HCl 10 MG capsule, Take  by mouth., Disp: , Rfl:   •  lisinopril (PRINIVIL,ZESTRIL) 20 MG tablet, Take 1 tablet by mouth Daily., Disp: 90 tablet, Rfl: 3  •  meloxicam (MOBIC) 15 MG tablet, Take 1 tablet by mouth Daily., Disp: 90 tablet, Rfl: 3  •  pioglitazone (ACTOS) 45 MG tablet, Take 1 tablet by mouth Daily., Disp: 90 tablet, Rfl: 2  •  simvastatin (ZOCOR) 40 MG tablet, Take 1 tablet by mouth Every Night., Disp: 90 tablet, Rfl: 3  •  vitamin C (ASCORBIC ACID) 500 MG tablet, Take 1,000 mg by mouth Daily., Disp: , Rfl:     Past Medical History:   Diagnosis Date   • Acute bronchitis    • Arthritis    • BPH (benign prostatic hyperplasia)    • Carpal tunnel syndrome    • Colon cancer screening    • Diabetes mellitus (CMS/HCC)    • Hyperlipidemia    • Hypertension    • Immunization deficiency    • Urinary urgency        Past Surgical History:   Procedure Laterality Date   • COLONOSCOPY  2019    normal   • HAND SURGERY Right     Right Thumb surgery        Family History   Problem Relation Age of Onset   • Diabetes Mother    • Hypertension Mother        Social History     Tobacco Use   • Smoking status: Never Smoker   • Smokeless tobacco: Never Used   Substance Use Topics   • Alcohol use: Yes            Objective     Vitals:    09/21/20 0830   BP: 136/86   Pulse: 59   Temp: 98.7 °F (37.1 °C)   SpO2: 98%     Body mass index is 38.17 kg/m².    Physical Exam  Vitals signs reviewed.   Constitutional:       Appearance: He is well-developed. He is not diaphoretic.   HENT:      Head: Normocephalic and atraumatic.   Eyes:      General: No scleral icterus.     Pupils: Pupils are equal, round, and reactive to light.   Neck:      Thyroid: No thyromegaly.   Cardiovascular:      Rate and Rhythm: Normal rate and regular rhythm.      Heart sounds: No murmur. No friction rub. No gallop.    Pulmonary:      Effort: Pulmonary effort is normal. No respiratory distress.      Breath sounds: No wheezing or rales.   Chest:       Chest wall: No tenderness.   Abdominal:      General: Bowel sounds are normal. There is no distension.      Palpations: Abdomen is soft.      Tenderness: There is no abdominal tenderness.   Musculoskeletal: Normal range of motion.         General: No deformity.   Lymphadenopathy:      Cervical: No cervical adenopathy.   Skin:     General: Skin is warm and dry.      Findings: No rash.   Neurological:      Cranial Nerves: No cranial nerve deficit.      Motor: No abnormal muscle tone.         Lab Results   Component Value Date    BUN 18 05/27/2020    CREATININE 0.81 05/27/2020    EGFRIFNONA 100 05/27/2020    EGFRIFAFRI 121 05/27/2020    BCR 22.2 05/27/2020    K 4.8 05/27/2020    CO2 27.6 05/27/2020    CALCIUM 9.3 05/27/2020    PROTENTOTREF 6.6 05/27/2020    ALBUMIN 4.20 05/27/2020    LABIL2 1.8 05/27/2020    AST 14 05/27/2020    ALT 21 05/27/2020       WBC   Date Value Ref Range Status   09/17/2019 7.0 3.4 - 10.8 x10E3/uL Final   07/13/2019 18.99 (H) 4.5 - 11.0 10*3/uL Final     RBC   Date Value Ref Range Status   09/17/2019 5.09 4.14 - 5.80 x10E6/uL Final   07/13/2019 4.89 4.5 - 5.9 10*6/uL Final     Hemoglobin   Date Value Ref Range Status   09/17/2019 14.8 13.0 - 17.7 g/dL Final   07/13/2019 14.0 13.5 - 17.5 g/dL Final     Hematocrit   Date Value Ref Range Status   09/17/2019 43.1 37.5 - 51.0 % Final   07/13/2019 44.0 41.0 - 53.0 % Final     MCV   Date Value Ref Range Status   09/17/2019 85 79 - 97 fL Final   07/13/2019 90.0 80.0 - 100.0 fL Final     MCH   Date Value Ref Range Status   09/17/2019 29.1 26.6 - 33.0 pg Final   07/13/2019 28.6 26.0 - 34.0 pg Final     MCHC   Date Value Ref Range Status   09/17/2019 34.3 31.5 - 35.7 g/dL Final   07/13/2019 31.8 31.0 - 37.0 g/dL Final     RDW   Date Value Ref Range Status   09/17/2019 13.8 12.3 - 15.4 % Final   07/13/2019 14.0 12.0 - 16.8 % Final     MPV   Date Value Ref Range Status   07/13/2019 11.0 (H) 6.7 - 10.8 fL Final     Platelets   Date Value Ref Range Status    09/17/2019 274 150 - 450 x10E3/uL Final   07/13/2019 219 140 - 440 10*3/uL Final     Neutrophil Rel %   Date Value Ref Range Status   09/17/2019 59 Not Estab. % Final   07/13/2019 84.8 (H) 45 - 80 % Final     Lymphocyte Rel %   Date Value Ref Range Status   09/17/2019 27 Not Estab. % Final   07/13/2019 6.5 (L) 15 - 50 % Final     Monocyte Rel %   Date Value Ref Range Status   09/17/2019 10 Not Estab. % Final   07/13/2019 7.9 0 - 15 % Final     Eosinophil Rel %   Date Value Ref Range Status   09/17/2019 4 Not Estab. % Final     Eosinophil %   Date Value Ref Range Status   07/13/2019 0.3 0 - 7 % Final     Basophil Rel %   Date Value Ref Range Status   09/17/2019 0 Not Estab. % Final   07/13/2019 0.1 0 - 2 % Final     Immature Grans %   Date Value Ref Range Status   07/13/2019 0.4 (H) 0 % Final     Neutrophils Absolute   Date Value Ref Range Status   09/17/2019 4.1 1.4 - 7.0 x10E3/uL Final   07/13/2019 16.10 (H) 2.0 - 8.8 10*3/uL Final     Lymphocytes Absolute   Date Value Ref Range Status   09/17/2019 1.9 0.7 - 3.1 x10E3/uL Final   07/13/2019 1.24 0.7 - 5.5 10*3/uL Final     Monocytes Absolute   Date Value Ref Range Status   09/17/2019 0.7 0.1 - 0.9 x10E3/uL Final   07/13/2019 1.50 0.0 - 1.7 10*3/uL Final     Eosinophils Absolute   Date Value Ref Range Status   09/17/2019 0.3 0.0 - 0.4 x10E3/uL Final   07/13/2019 0.06 0.0 - 0.8 10*3/uL Final     Basophils Absolute   Date Value Ref Range Status   09/17/2019 0.0 0.0 - 0.2 x10E3/uL Final   07/13/2019 0.02 0.0 - 0.2 10*3/uL Final     Immature Grans, Absolute   Date Value Ref Range Status   07/13/2019 0.07 <1 10*3/uL Final     nRBC   Date Value Ref Range Status   07/13/2019 0 0 /100(WBC) Final       Lab Results   Component Value Date    HGBA1C 6.78 (H) 05/27/2020       No results found for: [id number]    TSH   Date Value Ref Range Status   09/17/2019 1.470 0.450 - 4.500 uIU/mL Final       No results found for: CHOL  Lab Results   Component Value Date    TRIG 116  05/27/2020     Lab Results   Component Value Date    HDL 57 05/27/2020     Lab Results   Component Value Date    LDL 97 05/27/2020     Lab Results   Component Value Date    VLDL 23.2 05/27/2020     No results found for: LDLHDL      Procedures    Assessment/Plan   Problems Addressed this Visit        Cardiovascular and Mediastinum    Hypertension    Relevant Medications    lisinopril (PRINIVIL,ZESTRIL) 20 MG tablet    Hyperlipidemia    Relevant Orders    Lipid Panel With / Chol / HDL Ratio       Endocrine    Diabetes mellitus (CMS/HCC)    Relevant Medications    pioglitazone (ACTOS) 45 MG tablet    Other Relevant Orders    Comprehensive Metabolic Panel    Hemoglobin A1c       Other    Encounter for annual health examination - Primary        Preventive counseling:  Declines flu shot.  Regular exercise recommended.    Orders Placed This Encounter   Procedures   • Comprehensive Metabolic Panel   • Hemoglobin A1c   • Lipid Panel With / Chol / HDL Ratio       Current Outpatient Medications   Medication Sig Dispense Refill   • Cetirizine HCl 10 MG capsule Take  by mouth.     • lisinopril (PRINIVIL,ZESTRIL) 20 MG tablet Take 1 tablet by mouth Daily. 90 tablet 3   • meloxicam (MOBIC) 15 MG tablet Take 1 tablet by mouth Daily. 90 tablet 3   • pioglitazone (ACTOS) 45 MG tablet Take 1 tablet by mouth Daily. 90 tablet 2   • simvastatin (ZOCOR) 40 MG tablet Take 1 tablet by mouth Every Night. 90 tablet 3   • vitamin C (ASCORBIC ACID) 500 MG tablet Take 1,000 mg by mouth Daily.       No current facility-administered medications for this visit.        No follow-ups on file.    There are no Patient Instructions on file for this visit.

## 2020-09-22 LAB
ALBUMIN SERPL-MCNC: 4.4 G/DL (ref 3.5–5.2)
ALBUMIN/GLOB SERPL: 2.2 G/DL
ALP SERPL-CCNC: 70 U/L (ref 39–117)
ALT SERPL-CCNC: 22 U/L (ref 1–41)
AST SERPL-CCNC: 13 U/L (ref 1–40)
BILIRUB SERPL-MCNC: 0.3 MG/DL (ref 0–1.2)
BUN SERPL-MCNC: 19 MG/DL (ref 6–20)
BUN/CREAT SERPL: 25 (ref 7–25)
CALCIUM SERPL-MCNC: 9.4 MG/DL (ref 8.6–10.5)
CHLORIDE SERPL-SCNC: 103 MMOL/L (ref 98–107)
CHOLEST SERPL-MCNC: 188 MG/DL (ref 0–200)
CHOLEST/HDLC SERPL: 3.08 {RATIO}
CO2 SERPL-SCNC: 27.8 MMOL/L (ref 22–29)
CREAT SERPL-MCNC: 0.76 MG/DL (ref 0.76–1.27)
GLOBULIN SER CALC-MCNC: 2 GM/DL
GLUCOSE SERPL-MCNC: 117 MG/DL (ref 65–99)
HBA1C MFR BLD: 6.8 % (ref 4.8–5.6)
HDLC SERPL-MCNC: 61 MG/DL (ref 40–60)
LDLC SERPL CALC-MCNC: 101 MG/DL (ref 0–100)
POTASSIUM SERPL-SCNC: 5 MMOL/L (ref 3.5–5.2)
PROT SERPL-MCNC: 6.4 G/DL (ref 6–8.5)
SODIUM SERPL-SCNC: 140 MMOL/L (ref 136–145)
TRIGL SERPL-MCNC: 132 MG/DL (ref 0–150)
VLDLC SERPL CALC-MCNC: 26.4 MG/DL

## 2021-01-08 DIAGNOSIS — M19.90 ARTHRITIS: ICD-10-CM

## 2021-01-08 RX ORDER — MELOXICAM 15 MG/1
TABLET ORAL
Qty: 90 TABLET | Refills: 2 | Status: SHIPPED | OUTPATIENT
Start: 2021-01-08 | End: 2022-02-17 | Stop reason: SDUPTHER

## 2021-01-18 ENCOUNTER — OFFICE VISIT (OUTPATIENT)
Dept: FAMILY MEDICINE CLINIC | Facility: CLINIC | Age: 56
End: 2021-01-18

## 2021-01-18 VITALS
HEART RATE: 52 BPM | WEIGHT: 254 LBS | BODY MASS INDEX: 38.49 KG/M2 | TEMPERATURE: 98.6 F | OXYGEN SATURATION: 100 % | DIASTOLIC BLOOD PRESSURE: 80 MMHG | SYSTOLIC BLOOD PRESSURE: 124 MMHG | HEIGHT: 68 IN

## 2021-01-18 DIAGNOSIS — L72.3 SEBACEOUS CYST: ICD-10-CM

## 2021-01-18 DIAGNOSIS — E78.2 MIXED HYPERLIPIDEMIA: ICD-10-CM

## 2021-01-18 DIAGNOSIS — I10 ESSENTIAL HYPERTENSION: ICD-10-CM

## 2021-01-18 DIAGNOSIS — E11.9 TYPE 2 DIABETES MELLITUS WITHOUT COMPLICATION, WITHOUT LONG-TERM CURRENT USE OF INSULIN (HCC): Primary | ICD-10-CM

## 2021-01-18 PROCEDURE — 99214 OFFICE O/P EST MOD 30 MIN: CPT | Performed by: FAMILY MEDICINE

## 2021-01-18 RX ORDER — SIMVASTATIN 40 MG
40 TABLET ORAL NIGHTLY
Qty: 90 TABLET | Refills: 3 | Status: SHIPPED | OUTPATIENT
Start: 2021-01-18 | End: 2021-10-13 | Stop reason: SDUPTHER

## 2021-01-18 NOTE — PROGRESS NOTES
Chief Complaint   Patient presents with   • Hypertension       Subjective   Raúl Jarquin is a 55 y.o. male.     History of Present Illness   F/U HTN.  No orhtostasis.  Doing well with meds.    F/U hyperlipidemia.  No myalgias.  On meds reguallry.   F/U DM2.  On meds reguallry.     C/o cyst on back for months.  Not painful.   The following portions of the patient's history were reviewed and updated as appropriate: allergies, current medications, past family history, past medical history, past social history, past surgical history and problem list.    Review of Systems   Constitutional: Negative for appetite change and fatigue.   HENT: Negative for nosebleeds and sore throat.    Eyes: Negative for blurred vision and visual disturbance.   Respiratory: Negative for shortness of breath and wheezing.    Cardiovascular: Negative for chest pain and leg swelling.   Gastrointestinal: Negative for abdominal distention and abdominal pain.   Endocrine: Negative for cold intolerance and polyuria.   Genitourinary: Negative for dysuria and hematuria.   Musculoskeletal: Negative for arthralgias and myalgias.   Skin: Negative for color change and rash.   Neurological: Negative for weakness and confusion.   Psychiatric/Behavioral: Negative for agitation and depressed mood.       Patient Active Problem List   Diagnosis   • Hypertension   • Hyperlipidemia   • Diabetes mellitus (CMS/HCC)   • Carpal tunnel syndrome   • BPH (benign prostatic hyperplasia)   • Arthritis   • Leukemoid reaction   • Acute right-sided low back pain   • Hematuria   • Sore throat   • Nephrolithiasis   • Encounter for annual health examination   • Sebaceous cyst       No Known Allergies      Current Outpatient Medications:   •  Cetirizine HCl 10 MG capsule, Take  by mouth., Disp: , Rfl:   •  lisinopril (PRINIVIL,ZESTRIL) 20 MG tablet, Take 1 tablet by mouth Daily., Disp: 90 tablet, Rfl: 3  •  meloxicam (MOBIC) 15 MG tablet, Take 1 tablet by mouth once daily,  Disp: 90 tablet, Rfl: 2  •  pioglitazone (ACTOS) 45 MG tablet, Take 1 tablet by mouth Daily., Disp: 90 tablet, Rfl: 2  •  simvastatin (ZOCOR) 40 MG tablet, Take 1 tablet by mouth Every Night., Disp: 90 tablet, Rfl: 3  •  vitamin C (ASCORBIC ACID) 500 MG tablet, Take 1,000 mg by mouth Daily., Disp: , Rfl:     Past Medical History:   Diagnosis Date   • Acute bronchitis    • Arthritis    • BPH (benign prostatic hyperplasia)    • Carpal tunnel syndrome    • Colon cancer screening    • Diabetes mellitus (CMS/HCC)    • Hyperlipidemia    • Hypertension    • Immunization deficiency    • Urinary urgency        Past Surgical History:   Procedure Laterality Date   • COLONOSCOPY  2019    normal   • HAND SURGERY Right     Right Thumb surgery        Family History   Problem Relation Age of Onset   • Diabetes Mother    • Hypertension Mother        Social History     Tobacco Use   • Smoking status: Never Smoker   • Smokeless tobacco: Never Used   Substance Use Topics   • Alcohol use: Yes            Objective     Vitals:    01/18/21 1354   BP: 124/80   Pulse: 52   Temp: 98.6 °F (37 °C)   SpO2: 100%     Body mass index is 38.63 kg/m².    Physical Exam  Vitals signs reviewed.   Constitutional:       Appearance: He is well-developed. He is not diaphoretic.   HENT:      Head: Normocephalic and atraumatic.   Eyes:      General: No scleral icterus.     Pupils: Pupils are equal, round, and reactive to light.   Neck:      Thyroid: No thyromegaly.   Cardiovascular:      Rate and Rhythm: Normal rate and regular rhythm.      Heart sounds: No murmur. No friction rub. No gallop.    Pulmonary:      Effort: Pulmonary effort is normal. No respiratory distress.      Breath sounds: No wheezing or rales.   Chest:      Chest wall: No tenderness.   Abdominal:      General: Bowel sounds are normal. There is no distension.      Palpations: Abdomen is soft.      Tenderness: There is no abdominal tenderness.   Musculoskeletal: Normal range of motion.          General: No deformity.   Lymphadenopathy:      Cervical: No cervical adenopathy.   Skin:     General: Skin is warm and dry.      Findings: No rash.   Neurological:      Cranial Nerves: No cranial nerve deficit.      Motor: No abnormal muscle tone.         Lab Results   Component Value Date    BUN 19 09/21/2020    CREATININE 0.76 09/21/2020    EGFRIFNONA 107 09/21/2020    EGFRIFAFRI 130 09/21/2020    BCR 25.0 09/21/2020    K 5.0 09/21/2020    CO2 27.8 09/21/2020    CALCIUM 9.4 09/21/2020    PROTENTOTREF 6.4 09/21/2020    ALBUMIN 4.40 09/21/2020    LABIL2 2.2 09/21/2020    AST 13 09/21/2020    ALT 22 09/21/2020       WBC   Date Value Ref Range Status   09/17/2019 7.0 3.4 - 10.8 x10E3/uL Final   07/13/2019 18.99 (H) 4.5 - 11.0 10*3/uL Final     RBC   Date Value Ref Range Status   09/17/2019 5.09 4.14 - 5.80 x10E6/uL Final   07/13/2019 4.89 4.5 - 5.9 10*6/uL Final     Hemoglobin   Date Value Ref Range Status   09/17/2019 14.8 13.0 - 17.7 g/dL Final   07/13/2019 14.0 13.5 - 17.5 g/dL Final     Hematocrit   Date Value Ref Range Status   09/17/2019 43.1 37.5 - 51.0 % Final   07/13/2019 44.0 41.0 - 53.0 % Final     MCV   Date Value Ref Range Status   09/17/2019 85 79 - 97 fL Final   07/13/2019 90.0 80.0 - 100.0 fL Final     MCH   Date Value Ref Range Status   09/17/2019 29.1 26.6 - 33.0 pg Final   07/13/2019 28.6 26.0 - 34.0 pg Final     MCHC   Date Value Ref Range Status   09/17/2019 34.3 31.5 - 35.7 g/dL Final   07/13/2019 31.8 31.0 - 37.0 g/dL Final     RDW   Date Value Ref Range Status   09/17/2019 13.8 12.3 - 15.4 % Final   07/13/2019 14.0 12.0 - 16.8 % Final     MPV   Date Value Ref Range Status   07/13/2019 11.0 (H) 6.7 - 10.8 fL Final     Platelets   Date Value Ref Range Status   09/17/2019 274 150 - 450 x10E3/uL Final   07/13/2019 219 140 - 440 10*3/uL Final     Neutrophil Rel %   Date Value Ref Range Status   09/17/2019 59 Not Estab. % Final   07/13/2019 84.8 (H) 45 - 80 % Final     Lymphocyte Rel %   Date Value  Ref Range Status   09/17/2019 27 Not Estab. % Final   07/13/2019 6.5 (L) 15 - 50 % Final     Monocyte Rel %   Date Value Ref Range Status   09/17/2019 10 Not Estab. % Final   07/13/2019 7.9 0 - 15 % Final     Eosinophil Rel %   Date Value Ref Range Status   09/17/2019 4 Not Estab. % Final     Eosinophil %   Date Value Ref Range Status   07/13/2019 0.3 0 - 7 % Final     Basophil Rel %   Date Value Ref Range Status   09/17/2019 0 Not Estab. % Final   07/13/2019 0.1 0 - 2 % Final     Immature Grans %   Date Value Ref Range Status   07/13/2019 0.4 (H) 0 % Final     Neutrophils Absolute   Date Value Ref Range Status   09/17/2019 4.1 1.4 - 7.0 x10E3/uL Final   07/13/2019 16.10 (H) 2.0 - 8.8 10*3/uL Final     Lymphocytes Absolute   Date Value Ref Range Status   09/17/2019 1.9 0.7 - 3.1 x10E3/uL Final   07/13/2019 1.24 0.7 - 5.5 10*3/uL Final     Monocytes Absolute   Date Value Ref Range Status   09/17/2019 0.7 0.1 - 0.9 x10E3/uL Final   07/13/2019 1.50 0.0 - 1.7 10*3/uL Final     Eosinophils Absolute   Date Value Ref Range Status   09/17/2019 0.3 0.0 - 0.4 x10E3/uL Final   07/13/2019 0.06 0.0 - 0.8 10*3/uL Final     Basophils Absolute   Date Value Ref Range Status   09/17/2019 0.0 0.0 - 0.2 x10E3/uL Final   07/13/2019 0.02 0.0 - 0.2 10*3/uL Final     Immature Grans, Absolute   Date Value Ref Range Status   07/13/2019 0.07 <1 10*3/uL Final     nRBC   Date Value Ref Range Status   07/13/2019 0 0 /100(WBC) Final       Lab Results   Component Value Date    HGBA1C 6.80 (H) 09/21/2020       No results found for: [id number]    TSH   Date Value Ref Range Status   09/17/2019 1.470 0.450 - 4.500 uIU/mL Final       No results found for: CHOL  Lab Results   Component Value Date    TRIG 132 09/21/2020     Lab Results   Component Value Date    HDL 61 (H) 09/21/2020     Lab Results   Component Value Date     (H) 09/21/2020     Lab Results   Component Value Date    VLDL 26.4 09/21/2020     No results found for:  [facility]      Procedures    Assessment/Plan   Problems Addressed this Visit     Hypertension    Relevant Orders    Comprehensive Metabolic Panel    Hyperlipidemia    Relevant Medications    simvastatin (ZOCOR) 40 MG tablet    Other Relevant Orders    Comprehensive Metabolic Panel    Diabetes mellitus (CMS/[facility]) - Primary    Relevant Orders    Hemoglobin A1c    Comprehensive Metabolic Panel    Sebaceous cyst      Diagnoses       Codes Comments    Type 2 diabetes mellitus without complication, without long-term current use of insulin (CMS/[facility])    -  Primary ICD-10-CM: E11.9  ICD-9-CM: 250.00     Mixed hyperlipidemia     ICD-10-CM: E78.2  ICD-9-CM: 272.2     Essential hypertension     ICD-10-CM: I10  ICD-9-CM: 401.9     Sebaceous cyst     ICD-10-CM: L72.3  ICD-9-CM: 706.2       Pt declines surgical consult.    Continue diabetic meds.    Continue lisinopril.       Orders Placed This Encounter   Procedures   • Hemoglobin A1c   • Comprehensive Metabolic Panel       Current Outpatient Medications   Medication Sig Dispense Refill   • Cetirizine HCl 10 MG capsule Take  by mouth.     • lisinopril (PRINIVIL,ZESTRIL) 20 MG tablet Take 1 tablet by mouth Daily. 90 tablet 3   • meloxicam (MOBIC) 15 MG tablet Take 1 tablet by mouth once daily 90 tablet 2   • pioglitazone (ACTOS) 45 MG tablet Take 1 tablet by mouth Daily. 90 tablet 2   • simvastatin (ZOCOR) 40 MG tablet Take 1 tablet by mouth Every Night. 90 tablet 3   • vitamin C (ASCORBIC ACID) 500 MG tablet Take 1,000 mg by mouth Daily.       No current facility-administered medications for this visit.        Rubéncollin Britton had no medications administered during this visit.    No follow-ups on file.    There are no Patient Instructions on file for this visit.

## 2021-01-19 LAB
ALBUMIN SERPL-MCNC: 4.5 G/DL (ref 3.5–5.2)
ALBUMIN/GLOB SERPL: 1.8 G/DL
ALP SERPL-CCNC: 73 U/L (ref 39–117)
ALT SERPL-CCNC: 24 U/L (ref 1–41)
AST SERPL-CCNC: 17 U/L (ref 1–40)
BILIRUB SERPL-MCNC: 0.4 MG/DL (ref 0–1.2)
BUN SERPL-MCNC: 16 MG/DL (ref 6–20)
BUN/CREAT SERPL: 21.1 (ref 7–25)
CALCIUM SERPL-MCNC: 9.6 MG/DL (ref 8.6–10.5)
CHLORIDE SERPL-SCNC: 102 MMOL/L (ref 98–107)
CO2 SERPL-SCNC: 31.8 MMOL/L (ref 22–29)
CREAT SERPL-MCNC: 0.76 MG/DL (ref 0.76–1.27)
GLOBULIN SER CALC-MCNC: 2.5 GM/DL
GLUCOSE SERPL-MCNC: 103 MG/DL (ref 65–99)
HBA1C MFR BLD: 6.8 % (ref 4.8–5.6)
POTASSIUM SERPL-SCNC: 5.3 MMOL/L (ref 3.5–5.2)
PROT SERPL-MCNC: 7 G/DL (ref 6–8.5)
SODIUM SERPL-SCNC: 138 MMOL/L (ref 136–145)

## 2021-05-24 ENCOUNTER — OFFICE VISIT (OUTPATIENT)
Dept: FAMILY MEDICINE CLINIC | Facility: CLINIC | Age: 56
End: 2021-05-24

## 2021-05-24 VITALS
HEIGHT: 68 IN | HEART RATE: 67 BPM | DIASTOLIC BLOOD PRESSURE: 71 MMHG | BODY MASS INDEX: 37.44 KG/M2 | WEIGHT: 247 LBS | OXYGEN SATURATION: 98 % | TEMPERATURE: 98.4 F | SYSTOLIC BLOOD PRESSURE: 110 MMHG

## 2021-05-24 DIAGNOSIS — E11.9 TYPE 2 DIABETES MELLITUS WITHOUT COMPLICATION, WITHOUT LONG-TERM CURRENT USE OF INSULIN (HCC): ICD-10-CM

## 2021-05-24 DIAGNOSIS — R35.0 BENIGN PROSTATIC HYPERPLASIA WITH URINARY FREQUENCY: ICD-10-CM

## 2021-05-24 DIAGNOSIS — E78.2 MIXED HYPERLIPIDEMIA: ICD-10-CM

## 2021-05-24 DIAGNOSIS — N40.1 BENIGN PROSTATIC HYPERPLASIA WITH URINARY FREQUENCY: ICD-10-CM

## 2021-05-24 DIAGNOSIS — I10 ESSENTIAL HYPERTENSION: Primary | ICD-10-CM

## 2021-05-24 PROCEDURE — 99214 OFFICE O/P EST MOD 30 MIN: CPT | Performed by: FAMILY MEDICINE

## 2021-05-24 RX ORDER — LISINOPRIL 20 MG/1
20 TABLET ORAL DAILY
Qty: 90 TABLET | Refills: 3 | Status: SHIPPED | OUTPATIENT
Start: 2021-05-24 | End: 2022-06-08

## 2021-05-24 NOTE — PROGRESS NOTES
Chief Complaint   Patient presents with   • Hypertension       Subjective   Raúl Jarquin is a 56 y.o. male.     History of Present Illness   F/U DM2.  On meds regularly.    F/U HTN.  No orthostasis.  On meds regulalry.    F/U hyperlipidemia.  No myalgias.  ON meds.    The following portions of the patient's history were reviewed and updated as appropriate: allergies, current medications, past family history, past medical history, past social history, past surgical history and problem list.    Review of Systems   Constitutional: Negative for appetite change and fatigue.   HENT: Negative for nosebleeds and sore throat.    Eyes: Negative for blurred vision and visual disturbance.   Respiratory: Negative for shortness of breath and wheezing.    Cardiovascular: Negative for chest pain and leg swelling.   Gastrointestinal: Negative for abdominal distention and abdominal pain.   Endocrine: Negative for cold intolerance and polyuria.   Genitourinary: Negative for dysuria and hematuria.   Musculoskeletal: Negative for arthralgias and myalgias.   Skin: Negative for color change and rash.   Neurological: Negative for weakness and confusion.   Psychiatric/Behavioral: Negative for agitation and depressed mood.       Patient Active Problem List   Diagnosis   • Hypertension   • Hyperlipidemia   • Diabetes mellitus (CMS/HCC)   • Carpal tunnel syndrome   • BPH (benign prostatic hyperplasia)   • Arthritis   • Leukemoid reaction   • Acute right-sided low back pain   • Hematuria   • Sore throat   • Nephrolithiasis   • Encounter for annual health examination   • Sebaceous cyst       No Known Allergies      Current Outpatient Medications:   •  Cetirizine HCl 10 MG capsule, Take  by mouth., Disp: , Rfl:   •  lisinopril (PRINIVIL,ZESTRIL) 20 MG tablet, Take 1 tablet by mouth Daily., Disp: 90 tablet, Rfl: 3  •  meloxicam (MOBIC) 15 MG tablet, Take 1 tablet by mouth once daily, Disp: 90 tablet, Rfl: 2  •  pioglitazone (ACTOS) 45 MG  tablet, Take 1 tablet by mouth Daily., Disp: 90 tablet, Rfl: 2  •  simvastatin (ZOCOR) 40 MG tablet, Take 1 tablet by mouth Every Night., Disp: 90 tablet, Rfl: 3  •  vitamin C (ASCORBIC ACID) 500 MG tablet, Take 1,000 mg by mouth Daily., Disp: , Rfl:     Past Medical History:   Diagnosis Date   • Acute bronchitis    • Arthritis    • BPH (benign prostatic hyperplasia)    • Carpal tunnel syndrome    • Colon cancer screening    • Diabetes mellitus (CMS/HCC)    • Hyperlipidemia    • Hypertension    • Immunization deficiency    • Urinary urgency        Past Surgical History:   Procedure Laterality Date   • COLONOSCOPY  2019    normal   • HAND SURGERY Right     Right Thumb surgery        Family History   Problem Relation Age of Onset   • Diabetes Mother    • Hypertension Mother        Social History     Tobacco Use   • Smoking status: Never Smoker   • Smokeless tobacco: Never Used   Substance Use Topics   • Alcohol use: Yes            Objective     Vitals:    05/24/21 1338   BP: 110/71   Pulse: 67   Temp: 98.4 °F (36.9 °C)   SpO2: 98%     Body mass index is 37.57 kg/m².    Physical Exam  Vitals reviewed.   Constitutional:       Appearance: He is well-developed. He is not diaphoretic.   HENT:      Head: Normocephalic and atraumatic.   Eyes:      General: No scleral icterus.     Pupils: Pupils are equal, round, and reactive to light.   Neck:      Thyroid: No thyromegaly.   Cardiovascular:      Rate and Rhythm: Normal rate and regular rhythm.      Heart sounds: No murmur heard.   No friction rub. No gallop.    Pulmonary:      Effort: Pulmonary effort is normal. No respiratory distress.      Breath sounds: No wheezing or rales.   Chest:      Chest wall: No tenderness.   Abdominal:      General: Bowel sounds are normal. There is no distension.      Palpations: Abdomen is soft.      Tenderness: There is no abdominal tenderness.   Musculoskeletal:         General: No deformity. Normal range of motion.   Lymphadenopathy:       Cervical: No cervical adenopathy.   Skin:     General: Skin is warm and dry.      Findings: No rash.   Neurological:      Cranial Nerves: No cranial nerve deficit.      Motor: No abnormal muscle tone.         Lab Results   Component Value Date    BUN 16 01/18/2021    CREATININE 0.76 01/18/2021    EGFRIFNONA 107 01/18/2021    EGFRIFAFRI 130 01/18/2021    BCR 21.1 01/18/2021    K 5.3 (H) 01/18/2021    CO2 31.8 (H) 01/18/2021    CALCIUM 9.6 01/18/2021    PROTENTOTREF 7.0 01/18/2021    ALBUMIN 4.50 01/18/2021    LABIL2 1.8 01/18/2021    AST 17 01/18/2021    ALT 24 01/18/2021       WBC   Date Value Ref Range Status   09/17/2019 7.0 3.4 - 10.8 x10E3/uL Final   07/13/2019 18.99 (H) 4.5 - 11.0 10*3/uL Final     RBC   Date Value Ref Range Status   09/17/2019 5.09 4.14 - 5.80 x10E6/uL Final   07/13/2019 4.89 4.5 - 5.9 10*6/uL Final     Hemoglobin   Date Value Ref Range Status   09/17/2019 14.8 13.0 - 17.7 g/dL Final   07/13/2019 14.0 13.5 - 17.5 g/dL Final     Hematocrit   Date Value Ref Range Status   09/17/2019 43.1 37.5 - 51.0 % Final   07/13/2019 44.0 41.0 - 53.0 % Final     MCV   Date Value Ref Range Status   09/17/2019 85 79 - 97 fL Final   07/13/2019 90.0 80.0 - 100.0 fL Final     MCH   Date Value Ref Range Status   09/17/2019 29.1 26.6 - 33.0 pg Final   07/13/2019 28.6 26.0 - 34.0 pg Final     MCHC   Date Value Ref Range Status   09/17/2019 34.3 31.5 - 35.7 g/dL Final   07/13/2019 31.8 31.0 - 37.0 g/dL Final     RDW   Date Value Ref Range Status   09/17/2019 13.8 12.3 - 15.4 % Final   07/13/2019 14.0 12.0 - 16.8 % Final     MPV   Date Value Ref Range Status   07/13/2019 11.0 (H) 6.7 - 10.8 fL Final     Platelets   Date Value Ref Range Status   09/17/2019 274 150 - 450 x10E3/uL Final   07/13/2019 219 140 - 440 10*3/uL Final     Neutrophil Rel %   Date Value Ref Range Status   09/17/2019 59 Not Estab. % Final   07/13/2019 84.8 (H) 45 - 80 % Final     Lymphocyte Rel %   Date Value Ref Range Status   09/17/2019 27 Not  Estab. % Final   07/13/2019 6.5 (L) 15 - 50 % Final     Monocyte Rel %   Date Value Ref Range Status   09/17/2019 10 Not Estab. % Final   07/13/2019 7.9 0 - 15 % Final     Eosinophil Rel %   Date Value Ref Range Status   09/17/2019 4 Not Estab. % Final     Eosinophil %   Date Value Ref Range Status   07/13/2019 0.3 0 - 7 % Final     Basophil Rel %   Date Value Ref Range Status   09/17/2019 0 Not Estab. % Final   07/13/2019 0.1 0 - 2 % Final     Immature Grans %   Date Value Ref Range Status   07/13/2019 0.4 (H) 0 % Final     Neutrophils Absolute   Date Value Ref Range Status   09/17/2019 4.1 1.4 - 7.0 x10E3/uL Final   07/13/2019 16.10 (H) 2.0 - 8.8 10*3/uL Final     Lymphocytes Absolute   Date Value Ref Range Status   09/17/2019 1.9 0.7 - 3.1 x10E3/uL Final   07/13/2019 1.24 0.7 - 5.5 10*3/uL Final     Monocytes Absolute   Date Value Ref Range Status   09/17/2019 0.7 0.1 - 0.9 x10E3/uL Final   07/13/2019 1.50 0.0 - 1.7 10*3/uL Final     Eosinophils Absolute   Date Value Ref Range Status   09/17/2019 0.3 0.0 - 0.4 x10E3/uL Final   07/13/2019 0.06 0.0 - 0.8 10*3/uL Final     Basophils Absolute   Date Value Ref Range Status   09/17/2019 0.0 0.0 - 0.2 x10E3/uL Final   07/13/2019 0.02 0.0 - 0.2 10*3/uL Final     Immature Grans, Absolute   Date Value Ref Range Status   07/13/2019 0.07 <1 10*3/uL Final     nRBC   Date Value Ref Range Status   07/13/2019 0 0 /100(WBC) Final       Lab Results   Component Value Date    HGBA1C 6.80 (H) 01/18/2021       No results found for: [id number]    TSH   Date Value Ref Range Status   09/17/2019 1.470 0.450 - 4.500 uIU/mL Final       No results found for: CHOL  Lab Results   Component Value Date    TRIG 132 09/21/2020     Lab Results   Component Value Date    HDL 61 (H) 09/21/2020     Lab Results   Component Value Date     (H) 09/21/2020     Lab Results   Component Value Date    VLDL 26.4 09/21/2020     No results found for: LDLHDL      Procedures    Assessment/Plan   Problems  Addressed this Visit     BPH (benign prostatic hyperplasia)    Relevant Orders    PSA DIAGNOSTIC    Diabetes mellitus (CMS/[facility])    Relevant Orders    Hemoglobin A1c    Hyperlipidemia    Relevant Orders    Comprehensive Metabolic Panel    Lipid Panel With / Chol / HDL Ratio    Hypertension - Primary    Relevant Medications    lisinopril (PRINIVIL,ZESTRIL) 20 MG tablet      Diagnoses       Codes Comments    Essential hypertension    -  Primary ICD-10-CM: I10  ICD-9-CM: 401.9     Mixed hyperlipidemia     ICD-10-CM: E78.2  ICD-9-CM: 272.2     Type 2 diabetes mellitus without complication, without long-term current use of insulin (CMS/[facility])     ICD-10-CM: E11.9  ICD-9-CM: 250.00     Benign prostatic hyperplasia with urinary frequency     ICD-10-CM: N40.1, R35.0  ICD-9-CM: 600.01, 788.41         Encouraged to get covid vaccine.    HTN.  Controlled.  RF lisinopril.    DM2   Controlled.  Continue wenceslao.    BPH.  Check PSA.     Orders Placed This Encounter   Procedures   • Comprehensive Metabolic Panel     Order Specific Question:   Release to patient     Answer:   Immediate   • Lipid Panel With / Chol / HDL Ratio     Order Specific Question:   Release to patient     Answer:   Immediate   • Hemoglobin A1c     Order Specific Question:   Release to patient     Answer:   Immediate   • PSA DIAGNOSTIC     Order Specific Question:   Release to patient     Answer:   Immediate       Current Outpatient Medications   Medication Sig Dispense Refill   • Cetirizine HCl 10 MG capsule Take  by mouth.     • lisinopril (PRINIVIL,ZESTRIL) 20 MG tablet Take 1 tablet by mouth Daily. 90 tablet 3   • meloxicam (MOBIC) 15 MG tablet Take 1 tablet by mouth once daily 90 tablet 2   • pioglitazone (ACTOS) 45 MG tablet Take 1 tablet by mouth Daily. 90 tablet 2   • simvastatin (ZOCOR) 40 MG tablet Take 1 tablet by mouth Every Night. 90 tablet 3   • vitamin C (ASCORBIC ACID) 500 MG tablet Take 1,000 mg by mouth Daily.       No current facility-administered  medications for this visit.       Raúl Britton had no medications administered during this visit.    No follow-ups on file.    There are no Patient Instructions on file for this visit.

## 2021-05-25 LAB
ALBUMIN SERPL-MCNC: 4.4 G/DL (ref 3.5–5.2)
ALBUMIN/GLOB SERPL: 2.1 G/DL
ALP SERPL-CCNC: 66 U/L (ref 39–117)
ALT SERPL-CCNC: 23 U/L (ref 1–41)
AST SERPL-CCNC: 12 U/L (ref 1–40)
BILIRUB SERPL-MCNC: 0.3 MG/DL (ref 0–1.2)
BUN SERPL-MCNC: 23 MG/DL (ref 6–20)
BUN/CREAT SERPL: 29.5 (ref 7–25)
CALCIUM SERPL-MCNC: 9.6 MG/DL (ref 8.6–10.5)
CHLORIDE SERPL-SCNC: 108 MMOL/L (ref 98–107)
CHOLEST SERPL-MCNC: 137 MG/DL (ref 0–200)
CHOLEST/HDLC SERPL: 2.25 {RATIO}
CO2 SERPL-SCNC: 27.1 MMOL/L (ref 22–29)
CREAT SERPL-MCNC: 0.78 MG/DL (ref 0.76–1.27)
GLOBULIN SER CALC-MCNC: 2.1 GM/DL
GLUCOSE SERPL-MCNC: 105 MG/DL (ref 65–99)
HBA1C MFR BLD: 6.6 % (ref 4.8–5.6)
HDLC SERPL-MCNC: 61 MG/DL (ref 40–60)
LDLC SERPL CALC-MCNC: 61 MG/DL (ref 0–100)
POTASSIUM SERPL-SCNC: 4.7 MMOL/L (ref 3.5–5.2)
PROT SERPL-MCNC: 6.5 G/DL (ref 6–8.5)
PSA SERPL-MCNC: 0.54 NG/ML (ref 0–4)
SODIUM SERPL-SCNC: 143 MMOL/L (ref 136–145)
TRIGL SERPL-MCNC: 79 MG/DL (ref 0–150)
VLDLC SERPL CALC-MCNC: 15 MG/DL (ref 5–40)

## 2021-09-08 DIAGNOSIS — E11.9 TYPE 2 DIABETES MELLITUS WITHOUT COMPLICATION, WITHOUT LONG-TERM CURRENT USE OF INSULIN (HCC): ICD-10-CM

## 2021-09-08 RX ORDER — PIOGLITAZONEHYDROCHLORIDE 45 MG/1
45 TABLET ORAL DAILY
Qty: 90 TABLET | Refills: 2 | Status: SHIPPED | OUTPATIENT
Start: 2021-09-08 | End: 2022-02-17 | Stop reason: SDUPTHER

## 2021-09-08 NOTE — TELEPHONE ENCOUNTER
Rx Refill Note  Requested Prescriptions     Pending Prescriptions Disp Refills   • pioglitazone (ACTOS) 45 MG tablet 90 tablet 2     Sig: Take 1 tablet by mouth Daily.      Last office visit with prescribing clinician: 5/24/2021      Next office visit with prescribing clinician: 10/13/2021           Last filled 9/21/2020         {TIP  Is Refill Pharmacy correct?:23}  Jeniffer Pang MA  09/08/21, 13:57 EDT

## 2021-09-08 NOTE — TELEPHONE ENCOUNTER
Caller: SCOTT REYNOSO    Relationship: Emergency Contact    Best call back number: [phone]    Medication needed:   Requested Prescriptions     Pending Prescriptions Disp Refills   • pioglitazone (ACTOS) 45 MG tablet 90 tablet 2     Sig: Take 1 tablet by mouth Daily.       When do you need the refill by: TODAY    What additional details did the patient provide when requesting the medication: PATIENT IS OUT    Does the patient have less than a 3 day supply:  [x] Yes  [] No    What is the patient's preferred pharmacy: [facility] - [facility] [address] [address] [facility] - [phone] [facility] [place] [phone] FX

## 2021-10-13 ENCOUNTER — OFFICE VISIT (OUTPATIENT)
Dept: FAMILY MEDICINE CLINIC | Facility: CLINIC | Age: 56
End: 2021-10-13

## 2021-10-13 VITALS
TEMPERATURE: 98 F | OXYGEN SATURATION: 99 % | DIASTOLIC BLOOD PRESSURE: 71 MMHG | BODY MASS INDEX: 36.37 KG/M2 | SYSTOLIC BLOOD PRESSURE: 126 MMHG | HEART RATE: 50 BPM | HEIGHT: 68 IN | WEIGHT: 240 LBS

## 2021-10-13 DIAGNOSIS — E78.2 MIXED HYPERLIPIDEMIA: ICD-10-CM

## 2021-10-13 DIAGNOSIS — E11.9 TYPE 2 DIABETES MELLITUS WITHOUT COMPLICATION, WITHOUT LONG-TERM CURRENT USE OF INSULIN (HCC): Primary | ICD-10-CM

## 2021-10-13 DIAGNOSIS — I10 PRIMARY HYPERTENSION: ICD-10-CM

## 2021-10-13 PROCEDURE — 99396 PREV VISIT EST AGE 40-64: CPT | Performed by: FAMILY MEDICINE

## 2021-10-13 PROCEDURE — 99214 OFFICE O/P EST MOD 30 MIN: CPT | Performed by: FAMILY MEDICINE

## 2021-10-13 RX ORDER — SIMVASTATIN 40 MG
40 TABLET ORAL NIGHTLY
Qty: 90 TABLET | Refills: 3 | Status: SHIPPED | OUTPATIENT
Start: 2021-10-13 | End: 2022-09-15

## 2021-10-13 NOTE — PROGRESS NOTES
Patient here for annual physical exam    Subjective   Raúl Jarquin is a 56 y.o. male.     History of Present Illness   56 year old WM here for annual.    The following portions of the patient's history were reviewed and updated as appropriate: allergies, current medications, past family history, past medical history, past social history, past surgical history and problem list      FU HTN.  No orthostasis.  Doing well with meds.  No Se.  F/U hyperlipidmeia.  No myalgias.    F/U DM2.  Doing well with meds.         Last colonoscopy:2019  Optometry:UTD.  Dentist: UTD.  Last PSA(if applicable):5/21.  Last mammo(if applicable):NA    Immunization History   Administered Date(s) Administered   • COVID-19 (PFIZER) 08/28/2021, 09/24/2021   • Hepatitis A 05/18/2018, 12/06/2018, 07/17/2019   • Pneumococcal Polysaccharide (PPSV23) 11/14/2009       Review of Systems   Constitutional: Negative for appetite change and fatigue.   HENT: Negative for nosebleeds and sore throat.    Eyes: Negative for blurred vision and visual disturbance.   Respiratory: Negative for shortness of breath and wheezing.    Cardiovascular: Negative for chest pain and leg swelling.   Gastrointestinal: Negative for abdominal distention and abdominal pain.   Endocrine: Negative for cold intolerance and polyuria.   Genitourinary: Negative for dysuria and hematuria.   Musculoskeletal: Negative for arthralgias and myalgias.   Skin: Negative for color change and rash.   Neurological: Negative for weakness and confusion.   Psychiatric/Behavioral: Negative for agitation and depressed mood.       Patient Active Problem List   Diagnosis   • Hypertension   • Hyperlipidemia   • Diabetes mellitus (HCC)   • Carpal tunnel syndrome   • BPH (benign prostatic hyperplasia)   • Arthritis   • Leukemoid reaction   • Acute right-sided low back pain   • Hematuria   • Sore throat   • Nephrolithiasis   • Encounter for annual health examination   • Sebaceous cyst       No Known  Allergies      Current Outpatient Medications:   •  Cetirizine HCl 10 MG capsule, Take  by mouth., Disp: , Rfl:   •  lisinopril (PRINIVIL,ZESTRIL) 20 MG tablet, Take 1 tablet by mouth Daily., Disp: 90 tablet, Rfl: 3  •  meloxicam (MOBIC) 15 MG tablet, Take 1 tablet by mouth once daily, Disp: 90 tablet, Rfl: 2  •  pioglitazone (ACTOS) 45 MG tablet, Take 1 tablet by mouth Daily., Disp: 90 tablet, Rfl: 2  •  simvastatin (ZOCOR) 40 MG tablet, Take 1 tablet by mouth Every Night., Disp: 90 tablet, Rfl: 3  •  vitamin C (ASCORBIC ACID) 500 MG tablet, Take 1,000 mg by mouth Daily., Disp: , Rfl:     Past Medical History:   Diagnosis Date   • Acute bronchitis    • Arthritis    • BPH (benign prostatic hyperplasia)    • Carpal tunnel syndrome    • Colon cancer screening    • Diabetes mellitus (HCC)    • Hyperlipidemia    • Hypertension    • Immunization deficiency    • Urinary urgency        Past Surgical History:   Procedure Laterality Date   • COLONOSCOPY  2019    normal   • HAND SURGERY Right     Right Thumb surgery        Family History   Problem Relation Age of Onset   • Diabetes Mother    • Hypertension Mother        Social History     Tobacco Use   • Smoking status: Never Smoker   • Smokeless tobacco: Never Used   Substance Use Topics   • Alcohol use: Yes            Objective     Vitals:    10/13/21 1541   BP: 126/71   Pulse: 50   Temp: 98 °F (36.7 °C)   SpO2: 99%     Body mass index is 36.49 kg/m².    Physical Exam  Vitals reviewed.   Constitutional:       Appearance: He is well-developed. He is not diaphoretic.   HENT:      Head: Normocephalic and atraumatic.   Eyes:      General: No scleral icterus.     Pupils: Pupils are equal, round, and reactive to light.   Neck:      Thyroid: No thyromegaly.   Cardiovascular:      Rate and Rhythm: Normal rate and regular rhythm.      Heart sounds: No murmur heard.  No friction rub. No gallop.    Pulmonary:      Effort: Pulmonary effort is normal. No respiratory distress.       Breath sounds: No wheezing or rales.   Chest:      Chest wall: No tenderness.   Abdominal:      General: Bowel sounds are normal. There is no distension.      Palpations: Abdomen is soft.      Tenderness: There is no abdominal tenderness.   Musculoskeletal:         General: No deformity. Normal range of motion.   Lymphadenopathy:      Cervical: No cervical adenopathy.   Skin:     General: Skin is warm and dry.      Findings: No rash.   Neurological:      Cranial Nerves: No cranial nerve deficit.      Motor: No abnormal muscle tone.         Lab Results   Component Value Date    BUN 23 (H) 05/24/2021    CREATININE 0.78 05/24/2021    EGFRIFNONA 104 05/24/2021    EGFRIFAFRI 126 05/24/2021    BCR 29.5 (H) 05/24/2021    K 4.7 05/24/2021    CO2 27.1 05/24/2021    CALCIUM 9.6 05/24/2021    PROTENTOTREF 6.5 05/24/2021    ALBUMIN 4.40 05/24/2021    LABIL2 2.1 05/24/2021    AST 12 05/24/2021    ALT 23 05/24/2021       WBC   Date Value Ref Range Status   09/17/2019 7.0 3.4 - 10.8 x10E3/uL Final   07/13/2019 18.99 (H) 4.5 - 11.0 10*3/uL Final     RBC   Date Value Ref Range Status   09/17/2019 5.09 4.14 - 5.80 x10E6/uL Final   07/13/2019 4.89 4.5 - 5.9 10*6/uL Final     Hemoglobin   Date Value Ref Range Status   09/17/2019 14.8 13.0 - 17.7 g/dL Final   07/13/2019 14.0 13.5 - 17.5 g/dL Final     Hematocrit   Date Value Ref Range Status   09/17/2019 43.1 37.5 - 51.0 % Final   07/13/2019 44.0 41.0 - 53.0 % Final     MCV   Date Value Ref Range Status   09/17/2019 85 79 - 97 fL Final   07/13/2019 90.0 80.0 - 100.0 fL Final     MCH   Date Value Ref Range Status   09/17/2019 29.1 26.6 - 33.0 pg Final   07/13/2019 28.6 26.0 - 34.0 pg Final     MCHC   Date Value Ref Range Status   09/17/2019 34.3 31.5 - 35.7 g/dL Final   07/13/2019 31.8 31.0 - 37.0 g/dL Final     RDW   Date Value Ref Range Status   09/17/2019 13.8 12.3 - 15.4 % Final   07/13/2019 14.0 12.0 - 16.8 % Final     MPV   Date Value Ref Range Status   07/13/2019 11.0 (H) 6.7 -  10.8 fL Final     Platelets   Date Value Ref Range Status   09/17/2019 274 150 - 450 x10E3/uL Final   07/13/2019 219 140 - 440 10*3/uL Final     Neutrophil Rel %   Date Value Ref Range Status   09/17/2019 59 Not Estab. % Final   07/13/2019 84.8 (H) 45 - 80 % Final     Lymphocyte Rel %   Date Value Ref Range Status   09/17/2019 27 Not Estab. % Final   07/13/2019 6.5 (L) 15 - 50 % Final     Monocyte Rel %   Date Value Ref Range Status   09/17/2019 10 Not Estab. % Final   07/13/2019 7.9 0 - 15 % Final     Eosinophil Rel %   Date Value Ref Range Status   09/17/2019 4 Not Estab. % Final     Eosinophil %   Date Value Ref Range Status   07/13/2019 0.3 0 - 7 % Final     Basophil Rel %   Date Value Ref Range Status   09/17/2019 0 Not Estab. % Final   07/13/2019 0.1 0 - 2 % Final     Immature Grans %   Date Value Ref Range Status   07/13/2019 0.4 (H) 0 % Final     Neutrophils Absolute   Date Value Ref Range Status   09/17/2019 4.1 1.4 - 7.0 x10E3/uL Final   07/13/2019 16.10 (H) 2.0 - 8.8 10*3/uL Final     Lymphocytes Absolute   Date Value Ref Range Status   09/17/2019 1.9 0.7 - 3.1 x10E3/uL Final   07/13/2019 1.24 0.7 - 5.5 10*3/uL Final     Monocytes Absolute   Date Value Ref Range Status   09/17/2019 0.7 0.1 - 0.9 x10E3/uL Final   07/13/2019 1.50 0.0 - 1.7 10*3/uL Final     Eosinophils Absolute   Date Value Ref Range Status   09/17/2019 0.3 0.0 - 0.4 x10E3/uL Final   07/13/2019 0.06 0.0 - 0.8 10*3/uL Final     Basophils Absolute   Date Value Ref Range Status   09/17/2019 0.0 0.0 - 0.2 x10E3/uL Final   07/13/2019 0.02 0.0 - 0.2 10*3/uL Final     Immature Grans, Absolute   Date Value Ref Range Status   07/13/2019 0.07 <1 10*3/uL Final     nRBC   Date Value Ref Range Status   07/13/2019 0 0 /100(WBC) Final       Lab Results   Component Value Date    HGBA1C 6.60 (H) 05/24/2021       No results found for: [id number]    TSH   Date Value Ref Range Status   09/17/2019 1.470 0.450 - 4.500 uIU/mL Final       No results found for:  CHOL  Lab Results   Component Value Date    TRIG 79 05/24/2021     Lab Results   Component Value Date    HDL 61 (H) 05/24/2021     Lab Results   Component Value Date    LDL 61 05/24/2021     Lab Results   Component Value Date    VLDL 15 05/24/2021     No results found for: LDLHDL      Procedures    Assessment/Plan   Problems Addressed this Visit     Diabetes mellitus (HCC) - Primary    Relevant Orders    Hemoglobin A1c    Comprehensive Metabolic Panel    Vitamin B12    Hyperlipidemia    Relevant Orders    Comprehensive Metabolic Panel    Hypertension      Diagnoses       Codes Comments    Type 2 diabetes mellitus without complication, without long-term current use of insulin (HCC)    -  Primary ICD-10-CM: E11.9  ICD-9-CM: 250.00     Mixed hyperlipidemia     ICD-10-CM: E78.2  ICD-9-CM: 272.2     Primary hypertension     ICD-10-CM: I10  ICD-9-CM: 401.9       Dm2.  Doing well with meds.  Continue wenceslao.    Hyperlipidmeia.    LDL 61 5/21.  Continue statin. RF simvastatin today.     HTN. Controlled.  Continue meds.    Preventive Counseling:  Encouraged to stay active.  Covid utd.  Dentist/optho utd. Pneumo utd.     Pt declines flu shot.     Orders Placed This Encounter   Procedures   • Hemoglobin A1c     Order Specific Question:   Release to patient     Answer:   Immediate   • Comprehensive Metabolic Panel     Order Specific Question:   Release to patient     Answer:   Immediate   • Vitamin B12     Order Specific Question:   Release to patient     Answer:   Immediate       Current Outpatient Medications   Medication Sig Dispense Refill   • Cetirizine HCl 10 MG capsule Take  by mouth.     • lisinopril (PRINIVIL,ZESTRIL) 20 MG tablet Take 1 tablet by mouth Daily. 90 tablet 3   • meloxicam (MOBIC) 15 MG tablet Take 1 tablet by mouth once daily 90 tablet 2   • pioglitazone (ACTOS) 45 MG tablet Take 1 tablet by mouth Daily. 90 tablet 2   • simvastatin (ZOCOR) 40 MG tablet Take 1 tablet by mouth Every Night. 90 tablet 3   •  vitamin C (ASCORBIC ACID) 500 MG tablet Take 1,000 mg by mouth Daily.       No current facility-administered medications for this visit.       No follow-ups on file.    There are no Patient Instructions on file for this visit.

## 2021-10-14 LAB
ALBUMIN SERPL-MCNC: 4.4 G/DL (ref 3.5–5.2)
ALBUMIN/GLOB SERPL: 1.8 G/DL
ALP SERPL-CCNC: 73 U/L (ref 39–117)
ALT SERPL-CCNC: 22 U/L (ref 1–41)
AST SERPL-CCNC: 15 U/L (ref 1–40)
BILIRUB SERPL-MCNC: 0.4 MG/DL (ref 0–1.2)
BUN SERPL-MCNC: 21 MG/DL (ref 6–20)
BUN/CREAT SERPL: 27.3 (ref 7–25)
CALCIUM SERPL-MCNC: 9.8 MG/DL (ref 8.6–10.5)
CHLORIDE SERPL-SCNC: 101 MMOL/L (ref 98–107)
CO2 SERPL-SCNC: 28.4 MMOL/L (ref 22–29)
CREAT SERPL-MCNC: 0.77 MG/DL (ref 0.76–1.27)
GLOBULIN SER CALC-MCNC: 2.4 GM/DL
GLUCOSE SERPL-MCNC: 95 MG/DL (ref 65–99)
HBA1C MFR BLD: 6.8 % (ref 4.8–5.6)
POTASSIUM SERPL-SCNC: 4.5 MMOL/L (ref 3.5–5.2)
PROT SERPL-MCNC: 6.8 G/DL (ref 6–8.5)
SODIUM SERPL-SCNC: 138 MMOL/L (ref 136–145)
VIT B12 SERPL-MCNC: 510 PG/ML (ref 211–946)

## 2022-02-17 ENCOUNTER — OFFICE VISIT (OUTPATIENT)
Dept: FAMILY MEDICINE CLINIC | Facility: CLINIC | Age: 57
End: 2022-02-17

## 2022-02-17 VITALS
HEART RATE: 64 BPM | DIASTOLIC BLOOD PRESSURE: 78 MMHG | BODY MASS INDEX: 37.44 KG/M2 | HEIGHT: 68 IN | OXYGEN SATURATION: 98 % | TEMPERATURE: 98.2 F | WEIGHT: 247 LBS | SYSTOLIC BLOOD PRESSURE: 120 MMHG

## 2022-02-17 DIAGNOSIS — E11.9 TYPE 2 DIABETES MELLITUS WITHOUT COMPLICATION, WITHOUT LONG-TERM CURRENT USE OF INSULIN: ICD-10-CM

## 2022-02-17 DIAGNOSIS — Z11.59 ENCOUNTER FOR HEPATITIS C SCREENING TEST FOR LOW RISK PATIENT: ICD-10-CM

## 2022-02-17 DIAGNOSIS — M19.90 ARTHRITIS: ICD-10-CM

## 2022-02-17 DIAGNOSIS — Z00.00 ENCOUNTER FOR ANNUAL HEALTH EXAMINATION: Primary | ICD-10-CM

## 2022-02-17 DIAGNOSIS — I10 PRIMARY HYPERTENSION: ICD-10-CM

## 2022-02-17 DIAGNOSIS — E78.2 MIXED HYPERLIPIDEMIA: ICD-10-CM

## 2022-02-17 PROCEDURE — 99396 PREV VISIT EST AGE 40-64: CPT | Performed by: FAMILY MEDICINE

## 2022-02-17 PROCEDURE — 99214 OFFICE O/P EST MOD 30 MIN: CPT | Performed by: FAMILY MEDICINE

## 2022-02-17 RX ORDER — MELOXICAM 15 MG/1
15 TABLET ORAL DAILY
Qty: 90 TABLET | Refills: 2 | Status: SHIPPED | OUTPATIENT
Start: 2022-02-17 | End: 2022-12-29 | Stop reason: SDUPTHER

## 2022-02-17 RX ORDER — PIOGLITAZONEHYDROCHLORIDE 45 MG/1
45 TABLET ORAL DAILY
Qty: 90 TABLET | Refills: 2 | Status: SHIPPED | OUTPATIENT
Start: 2022-02-17 | End: 2022-11-21 | Stop reason: SDUPTHER

## 2022-02-17 NOTE — PROGRESS NOTES
Patient here for annual physical exam    Subjective   Raúl Jarquin is a 56 y.o. male.     History of Present Illness   56 year old WM here for annual.    The following portions of the patient's history were reviewed and updated as appropriate: allergies, current medications, past family history, past medical history, past social history, past surgical history and problem list    Last colonoscopy:2019  Optometry:UTD.  Dentist: UTD  Last PSA(if applicable):5/21.  Last mammo(if applicable):NA    F/U DM2.  On meds regulalry.    F/U HTN.  Controlled.  Continue meds.    F/U hyperlipidmeia.  On simvastatin. No Se.      Immunization History   Administered Date(s) Administered   • COVID-19 (PFIZER) PURPLE CAP 08/28/2021, 09/24/2021   • Hepatitis A 05/18/2018, 12/06/2018, 07/17/2019   • Pneumococcal Polysaccharide (PPSV23) 11/14/2009       Review of Systems   Constitutional: Negative for appetite change and fatigue.   HENT: Negative for nosebleeds and sore throat.    Eyes: Negative for blurred vision and visual disturbance.   Respiratory: Negative for shortness of breath and wheezing.    Cardiovascular: Negative for chest pain and leg swelling.   Gastrointestinal: Negative for abdominal distention and abdominal pain.   Endocrine: Negative for cold intolerance and polyuria.   Genitourinary: Negative for dysuria and hematuria.   Musculoskeletal: Negative for arthralgias and myalgias.   Skin: Negative for color change and rash.   Neurological: Negative for weakness and confusion.   Psychiatric/Behavioral: Negative for agitation and depressed mood.       Patient Active Problem List   Diagnosis   • Hypertension   • Hyperlipidemia   • Diabetes mellitus (HCC)   • Carpal tunnel syndrome   • BPH (benign prostatic hyperplasia)   • Arthritis   • Leukemoid reaction   • Acute right-sided low back pain   • Hematuria   • Sore throat   • Nephrolithiasis   • Encounter for annual health examination   • Sebaceous cyst   • Encounter for  hepatitis C screening test for low risk patient       No Known Allergies      Current Outpatient Medications:   •  Cetirizine HCl 10 MG capsule, Take  by mouth., Disp: , Rfl:   •  lisinopril (PRINIVIL,ZESTRIL) 20 MG tablet, Take 1 tablet by mouth Daily., Disp: 90 tablet, Rfl: 3  •  meloxicam (MOBIC) 15 MG tablet, Take 1 tablet by mouth Daily., Disp: 90 tablet, Rfl: 2  •  pioglitazone (ACTOS) 45 MG tablet, Take 1 tablet by mouth Daily., Disp: 90 tablet, Rfl: 2  •  simvastatin (ZOCOR) 40 MG tablet, Take 1 tablet by mouth Every Night., Disp: 90 tablet, Rfl: 3  •  vitamin C (ASCORBIC ACID) 500 MG tablet, Take 1,000 mg by mouth Daily., Disp: , Rfl:     Past Medical History:   Diagnosis Date   • Acute bronchitis    • Arthritis    • BPH (benign prostatic hyperplasia)    • Carpal tunnel syndrome    • Colon cancer screening    • Diabetes mellitus (HCC)    • Hyperlipidemia    • Hypertension    • Immunization deficiency    • Urinary urgency        Past Surgical History:   Procedure Laterality Date   • COLONOSCOPY  2019    normal   • HAND SURGERY Right     Right Thumb surgery        Family History   Problem Relation Age of Onset   • Diabetes Mother    • Hypertension Mother        Social History     Tobacco Use   • Smoking status: Never Smoker   • Smokeless tobacco: Never Used   Substance Use Topics   • Alcohol use: Yes            Objective     Vitals:    02/17/22 1515   BP: 120/78   Pulse: 64   Temp: 98.2 °F (36.8 °C)   SpO2: 98%     Body mass index is 37.56 kg/m².    Physical Exam  Vitals reviewed.   Constitutional:       Appearance: He is well-developed. He is not diaphoretic.   HENT:      Head: Normocephalic and atraumatic.   Eyes:      General: No scleral icterus.     Pupils: Pupils are equal, round, and reactive to light.   Neck:      Thyroid: No thyromegaly.   Cardiovascular:      Rate and Rhythm: Normal rate and regular rhythm.      Heart sounds: No murmur heard.  No friction rub. No gallop.    Pulmonary:      Effort:  Pulmonary effort is normal. No respiratory distress.      Breath sounds: No wheezing or rales.   Chest:      Chest wall: No tenderness.   Abdominal:      General: Bowel sounds are normal. There is no distension.      Palpations: Abdomen is soft.      Tenderness: There is no abdominal tenderness.   Musculoskeletal:         General: No deformity. Normal range of motion.   Lymphadenopathy:      Cervical: No cervical adenopathy.   Skin:     General: Skin is warm and dry.      Findings: No rash.   Neurological:      Cranial Nerves: No cranial nerve deficit.      Motor: No abnormal muscle tone.         Lab Results   Component Value Date    GLUCOSE 95 10/13/2021    BUN 21 (H) 10/13/2021    CREATININE 0.77 10/13/2021    EGFRIFNONA 105 10/13/2021    EGFRIFAFRI 128 10/13/2021    BCR 27.3 (H) 10/13/2021    K 4.5 10/13/2021    CO2 28.4 10/13/2021    CALCIUM 9.8 10/13/2021    PROTENTOTREF 6.8 10/13/2021    ALBUMIN 4.40 10/13/2021    LABIL2 1.8 10/13/2021    AST 15 10/13/2021    ALT 22 10/13/2021       WBC   Date Value Ref Range Status   09/17/2019 7.0 3.4 - 10.8 x10E3/uL Final   07/13/2019 18.99 (H) 4.5 - 11.0 10*3/uL Final     RBC   Date Value Ref Range Status   09/17/2019 5.09 4.14 - 5.80 x10E6/uL Final   07/13/2019 4.89 4.5 - 5.9 10*6/uL Final     Hemoglobin   Date Value Ref Range Status   09/17/2019 14.8 13.0 - 17.7 g/dL Final   07/13/2019 14.0 13.5 - 17.5 g/dL Final     Hematocrit   Date Value Ref Range Status   09/17/2019 43.1 37.5 - 51.0 % Final   07/13/2019 44.0 41.0 - 53.0 % Final     MCV   Date Value Ref Range Status   09/17/2019 85 79 - 97 fL Final   07/13/2019 90.0 80.0 - 100.0 fL Final     MCH   Date Value Ref Range Status   09/17/2019 29.1 26.6 - 33.0 pg Final   07/13/2019 28.6 26.0 - 34.0 pg Final     MCHC   Date Value Ref Range Status   09/17/2019 34.3 31.5 - 35.7 g/dL Final   07/13/2019 31.8 31.0 - 37.0 g/dL Final     RDW   Date Value Ref Range Status   09/17/2019 13.8 12.3 - 15.4 % Final   07/13/2019 14.0 12.0  - 16.8 % Final     MPV   Date Value Ref Range Status   07/13/2019 11.0 (H) 6.7 - 10.8 fL Final     Platelets   Date Value Ref Range Status   09/17/2019 274 150 - 450 x10E3/uL Final   07/13/2019 219 140 - 440 10*3/uL Final     Neutrophil Rel %   Date Value Ref Range Status   09/17/2019 59 Not Estab. % Final   07/13/2019 84.8 (H) 45 - 80 % Final     Lymphocyte Rel %   Date Value Ref Range Status   09/17/2019 27 Not Estab. % Final   07/13/2019 6.5 (L) 15 - 50 % Final     Monocyte Rel %   Date Value Ref Range Status   09/17/2019 10 Not Estab. % Final   07/13/2019 7.9 0 - 15 % Final     Eosinophil Rel %   Date Value Ref Range Status   09/17/2019 4 Not Estab. % Final     Eosinophil %   Date Value Ref Range Status   07/13/2019 0.3 0 - 7 % Final     Basophil Rel %   Date Value Ref Range Status   09/17/2019 0 Not Estab. % Final   07/13/2019 0.1 0 - 2 % Final     Immature Grans %   Date Value Ref Range Status   07/13/2019 0.4 (H) 0 % Final     Neutrophils Absolute   Date Value Ref Range Status   09/17/2019 4.1 1.4 - 7.0 x10E3/uL Final   07/13/2019 16.10 (H) 2.0 - 8.8 10*3/uL Final     Lymphocytes Absolute   Date Value Ref Range Status   09/17/2019 1.9 0.7 - 3.1 x10E3/uL Final   07/13/2019 1.24 0.7 - 5.5 10*3/uL Final     Monocytes Absolute   Date Value Ref Range Status   09/17/2019 0.7 0.1 - 0.9 x10E3/uL Final   07/13/2019 1.50 0.0 - 1.7 10*3/uL Final     Eosinophils Absolute   Date Value Ref Range Status   09/17/2019 0.3 0.0 - 0.4 x10E3/uL Final   07/13/2019 0.06 0.0 - 0.8 10*3/uL Final     Basophils Absolute   Date Value Ref Range Status   09/17/2019 0.0 0.0 - 0.2 x10E3/uL Final   07/13/2019 0.02 0.0 - 0.2 10*3/uL Final     Immature Grans, Absolute   Date Value Ref Range Status   07/13/2019 0.07 <1 10*3/uL Final     nRBC   Date Value Ref Range Status   07/13/2019 0 0 /100(WBC) Final       Lab Results   Component Value Date    HGBA1C 6.80 (H) 10/13/2021       Lab Results   Component Value Date    [id number] 510 10/13/2021        TSH   Date Value Ref Range Status   09/17/2019 1.470 0.450 - 4.500 uIU/mL Final       No results found for: CHOL  Lab Results   Component Value Date    TRIG 79 05/24/2021     Lab Results   Component Value Date    HDL 61 (H) 05/24/2021     Lab Results   Component Value Date    LDL 61 05/24/2021     Lab Results   Component Value Date    VLDL 15 05/24/2021     No results found for: LDLHDL      Procedures    Assessment/Plan   Problems Addressed this Visit     Arthritis    Relevant Medications    meloxicam (MOBIC) 15 MG tablet    Diabetes mellitus (HCC)    Relevant Medications    pioglitazone (ACTOS) 45 MG tablet    Other Relevant Orders    Hemoglobin A1c    Encounter for annual health examination - Primary    Encounter for hepatitis C screening test for low risk patient    Relevant Orders    Hepatitis C Antibody    Hyperlipidemia    Relevant Orders    Comprehensive Metabolic Panel    Lipid Panel With / Chol / HDL Ratio    Hypertension    Relevant Orders    Comprehensive Metabolic Panel      Diagnoses       Codes Comments    Encounter for annual health examination    -  Primary ICD-10-CM: Z00.00  ICD-9-CM: V70.0     Primary hypertension     ICD-10-CM: I10  ICD-9-CM: 401.9     Mixed hyperlipidemia     ICD-10-CM: E78.2  ICD-9-CM: 272.2     Type 2 diabetes mellitus without complication, without long-term current use of insulin (HCC)     ICD-10-CM: E11.9  ICD-9-CM: 250.00     Encounter for hepatitis C screening test for low risk patient     ICD-10-CM: Z11.59  ICD-9-CM: V73.89     Arthritis     ICD-10-CM: M19.90  ICD-9-CM: 716.90       DM2.  Doing well with meds.  RF pioglitazone.  Check A1c.    Hyperlipidmeia.  Check FLP.     Htn.  Continue meds. Check CMP.    Preventive Counseling:  Encouraged to stay active.  Covid vaccine booster declined..  HEP A UTD.  Pneumovax UTD.  Colonoscopy UTD.    Dentist UTD.  Optho UTD.      Orders Placed This Encounter   Procedures   • Comprehensive Metabolic Panel     Order Specific  Question:   Release to patient     Answer:   Immediate   • Lipid Panel With / Chol / HDL Ratio     Order Specific Question:   Release to patient     Answer:   Immediate   • Hemoglobin A1c     Order Specific Question:   Release to patient     Answer:   Immediate   • Hepatitis C Antibody     Order Specific Question:   Release to patient     Answer:   Immediate       Current Outpatient Medications   Medication Sig Dispense Refill   • Cetirizine HCl 10 MG capsule Take  by mouth.     • lisinopril (PRINIVIL,ZESTRIL) 20 MG tablet Take 1 tablet by mouth Daily. 90 tablet 3   • meloxicam (MOBIC) 15 MG tablet Take 1 tablet by mouth Daily. 90 tablet 2   • pioglitazone (ACTOS) 45 MG tablet Take 1 tablet by mouth Daily. 90 tablet 2   • simvastatin (ZOCOR) 40 MG tablet Take 1 tablet by mouth Every Night. 90 tablet 3   • vitamin C (ASCORBIC ACID) 500 MG tablet Take 1,000 mg by mouth Daily.       No current facility-administered medications for this visit.       Return in about 4 months (around 6/17/2022).    There are no Patient Instructions on file for this visit.

## 2022-02-18 LAB
ALBUMIN SERPL-MCNC: 4.7 G/DL (ref 3.8–4.9)
ALBUMIN/GLOB SERPL: 1.7 {RATIO} (ref 1.2–2.2)
ALP SERPL-CCNC: 78 IU/L (ref 44–121)
ALT SERPL-CCNC: 25 IU/L (ref 0–44)
AST SERPL-CCNC: 15 IU/L (ref 0–40)
BILIRUB SERPL-MCNC: 0.5 MG/DL (ref 0–1.2)
BUN SERPL-MCNC: 21 MG/DL (ref 6–24)
BUN/CREAT SERPL: 26 (ref 9–20)
CALCIUM SERPL-MCNC: 9.9 MG/DL (ref 8.7–10.2)
CHLORIDE SERPL-SCNC: 101 MMOL/L (ref 96–106)
CHOLEST SERPL-MCNC: 232 MG/DL (ref 100–199)
CHOLEST/HDLC SERPL: 3 RATIO (ref 0–5)
CO2 SERPL-SCNC: 23 MMOL/L (ref 20–29)
CREAT SERPL-MCNC: 0.81 MG/DL (ref 0.76–1.27)
GLOBULIN SER CALC-MCNC: 2.8 G/DL (ref 1.5–4.5)
GLUCOSE SERPL-MCNC: 106 MG/DL (ref 65–99)
HBA1C MFR BLD: 6.9 % (ref 4.8–5.6)
HCV AB S/CO SERPL IA: <0.1 S/CO RATIO (ref 0–0.9)
HDLC SERPL-MCNC: 78 MG/DL
LDLC SERPL CALC-MCNC: 135 MG/DL (ref 0–99)
POTASSIUM SERPL-SCNC: 4.9 MMOL/L (ref 3.5–5.2)
PROT SERPL-MCNC: 7.5 G/DL (ref 6–8.5)
SODIUM SERPL-SCNC: 142 MMOL/L (ref 134–144)
TRIGL SERPL-MCNC: 110 MG/DL (ref 0–149)
VLDLC SERPL CALC-MCNC: 19 MG/DL (ref 5–40)

## 2022-06-08 RX ORDER — LISINOPRIL 20 MG/1
TABLET ORAL
Qty: 90 TABLET | Refills: 3 | Status: SHIPPED | OUTPATIENT
Start: 2022-06-08

## 2022-06-08 NOTE — TELEPHONE ENCOUNTER
Rx Refill Note  Requested Prescriptions     Pending Prescriptions Disp Refills   • lisinopril (PRINIVIL,ZESTRIL) 20 MG tablet [Pharmacy Med Name: LISINOPRIL 20 MG Tablet] 90 tablet 3     Sig: TAKE 1 TABLET EVERY DAY      Last office visit with prescribing clinician: 2/17/2022      Next office visit with prescribing clinician: 6/16/2022         Last filled 5/24/2021           Jeniffer Pang MA  06/08/22, 16:35 EDT

## 2022-06-16 ENCOUNTER — OFFICE VISIT (OUTPATIENT)
Dept: FAMILY MEDICINE CLINIC | Facility: CLINIC | Age: 57
End: 2022-06-16

## 2022-06-16 VITALS
WEIGHT: 248 LBS | OXYGEN SATURATION: 98 % | TEMPERATURE: 98.4 F | DIASTOLIC BLOOD PRESSURE: 76 MMHG | SYSTOLIC BLOOD PRESSURE: 124 MMHG | HEIGHT: 68 IN | BODY MASS INDEX: 37.59 KG/M2 | HEART RATE: 71 BPM

## 2022-06-16 DIAGNOSIS — I10 PRIMARY HYPERTENSION: Primary | ICD-10-CM

## 2022-06-16 DIAGNOSIS — E78.2 MIXED HYPERLIPIDEMIA: ICD-10-CM

## 2022-06-16 DIAGNOSIS — E11.9 TYPE 2 DIABETES MELLITUS WITHOUT COMPLICATION, WITHOUT LONG-TERM CURRENT USE OF INSULIN: ICD-10-CM

## 2022-06-16 PROCEDURE — 99214 OFFICE O/P EST MOD 30 MIN: CPT | Performed by: FAMILY MEDICINE

## 2022-06-16 NOTE — PROGRESS NOTES
Chief Complaint   Patient presents with   • Diabetes       Subjective   Raúl Jarquin is a 57 y.o. male.     History of Present Illness   F/U HTn.  No orthostasis.  Doing well with meds.   F/U hyperlipidemia.  On meds regulalrly.   F/U DM2.  oN meds regualrly.   The following portions of the patient's history were reviewed and updated as appropriate: allergies, current medications, past family history, past medical history, past social history, past surgical history and problem list.    Review of Systems   Constitutional: Negative for appetite change and fatigue.   HENT: Negative for nosebleeds and sore throat.    Eyes: Negative for blurred vision and visual disturbance.   Respiratory: Negative for shortness of breath and wheezing.    Cardiovascular: Negative for chest pain and leg swelling.   Gastrointestinal: Negative for abdominal distention and abdominal pain.   Endocrine: Negative for cold intolerance and polyuria.   Genitourinary: Negative for dysuria and hematuria.   Musculoskeletal: Negative for arthralgias and myalgias.   Skin: Negative for color change and rash.   Neurological: Negative for weakness and confusion.   Psychiatric/Behavioral: Negative for agitation and depressed mood.       Patient Active Problem List   Diagnosis   • Hypertension   • Hyperlipidemia   • Diabetes mellitus (HCC)   • Carpal tunnel syndrome   • BPH (benign prostatic hyperplasia)   • Arthritis   • Leukemoid reaction   • Acute right-sided low back pain   • Hematuria   • Sore throat   • Nephrolithiasis   • Encounter for annual health examination   • Sebaceous cyst   • Encounter for hepatitis C screening test for low risk patient       No Known Allergies      Current Outpatient Medications:   •  Cetirizine HCl 10 MG capsule, Take  by mouth., Disp: , Rfl:   •  lisinopril (PRINIVIL,ZESTRIL) 20 MG tablet, TAKE 1 TABLET EVERY DAY, Disp: 90 tablet, Rfl: 3  •  meloxicam (MOBIC) 15 MG tablet, Take 1 tablet by mouth Daily., Disp: 90 tablet,  Rfl: 2  •  pioglitazone (ACTOS) 45 MG tablet, Take 1 tablet by mouth Daily., Disp: 90 tablet, Rfl: 2  •  simvastatin (ZOCOR) 40 MG tablet, Take 1 tablet by mouth Every Night., Disp: 90 tablet, Rfl: 3  •  vitamin C (ASCORBIC ACID) 500 MG tablet, Take 1,000 mg by mouth Daily., Disp: , Rfl:     Past Medical History:   Diagnosis Date   • Acute bronchitis    • Arthritis    • BPH (benign prostatic hyperplasia)    • Carpal tunnel syndrome    • Colon cancer screening    • Diabetes mellitus (HCC)    • Hyperlipidemia    • Hypertension    • Immunization deficiency    • Urinary urgency        Past Surgical History:   Procedure Laterality Date   • COLONOSCOPY  2019    normal   • HAND SURGERY Right     Right Thumb surgery        Family History   Problem Relation Age of Onset   • Diabetes Mother    • Hypertension Mother        Social History     Tobacco Use   • Smoking status: Never Smoker   • Smokeless tobacco: Never Used   Substance Use Topics   • Alcohol use: Yes            Objective     Vitals:    06/16/22 1526   BP: 124/76   Pulse: 71   Temp: 98.4 °F (36.9 °C)   SpO2: 98%     Body mass index is 37.71 kg/m².    Physical Exam  Vitals reviewed.   Constitutional:       Appearance: He is well-developed. He is not diaphoretic.   HENT:      Head: Normocephalic and atraumatic.   Eyes:      General: No scleral icterus.     Pupils: Pupils are equal, round, and reactive to light.   Neck:      Thyroid: No thyromegaly.   Cardiovascular:      Rate and Rhythm: Normal rate and regular rhythm.      Heart sounds: No murmur heard.    No friction rub. No gallop.   Pulmonary:      Effort: Pulmonary effort is normal. No respiratory distress.      Breath sounds: No wheezing or rales.   Chest:      Chest wall: No tenderness.   Abdominal:      General: Bowel sounds are normal. There is no distension.      Palpations: Abdomen is soft.      Tenderness: There is no abdominal tenderness.   Musculoskeletal:         General: No deformity. Normal range of  motion.   Lymphadenopathy:      Cervical: No cervical adenopathy.   Skin:     General: Skin is warm and dry.      Findings: No rash.   Neurological:      Cranial Nerves: No cranial nerve deficit.      Motor: No abnormal muscle tone.         Lab Results   Component Value Date    GLUCOSE 106 (H) 02/17/2022    BUN 21 02/17/2022    CREATININE 0.81 02/17/2022    EGFRIFNONA 101 02/17/2022    EGFRIFAFRI 116 02/17/2022    BCR 26 (H) 02/17/2022    K 4.9 02/17/2022    CO2 23 02/17/2022    CALCIUM 9.9 02/17/2022    PROTENTOTREF 7.5 02/17/2022    ALBUMIN 4.7 02/17/2022    LABIL2 1.7 02/17/2022    AST 15 02/17/2022    ALT 25 02/17/2022       WBC   Date Value Ref Range Status   09/17/2019 7.0 3.4 - 10.8 x10E3/uL Final   07/13/2019 18.99 (H) 4.5 - 11.0 10*3/uL Final     RBC   Date Value Ref Range Status   09/17/2019 5.09 4.14 - 5.80 x10E6/uL Final   07/13/2019 4.89 4.5 - 5.9 10*6/uL Final     Hemoglobin   Date Value Ref Range Status   09/17/2019 14.8 13.0 - 17.7 g/dL Final   07/13/2019 14.0 13.5 - 17.5 g/dL Final     Hematocrit   Date Value Ref Range Status   09/17/2019 43.1 37.5 - 51.0 % Final   07/13/2019 44.0 41.0 - 53.0 % Final     MCV   Date Value Ref Range Status   09/17/2019 85 79 - 97 fL Final   07/13/2019 90.0 80.0 - 100.0 fL Final     MCH   Date Value Ref Range Status   09/17/2019 29.1 26.6 - 33.0 pg Final   07/13/2019 28.6 26.0 - 34.0 pg Final     MCHC   Date Value Ref Range Status   09/17/2019 34.3 31.5 - 35.7 g/dL Final   07/13/2019 31.8 31.0 - 37.0 g/dL Final     RDW   Date Value Ref Range Status   09/17/2019 13.8 12.3 - 15.4 % Final   07/13/2019 14.0 12.0 - 16.8 % Final     MPV   Date Value Ref Range Status   07/13/2019 11.0 (H) 6.7 - 10.8 fL Final     Platelets   Date Value Ref Range Status   09/17/2019 274 150 - 450 x10E3/uL Final   07/13/2019 219 140 - 440 10*3/uL Final     Neutrophil Rel %   Date Value Ref Range Status   09/17/2019 59 Not Estab. % Final   07/13/2019 84.8 (H) 45 - 80 % Final     Lymphocyte Rel %    Date Value Ref Range Status   09/17/2019 27 Not Estab. % Final   07/13/2019 6.5 (L) 15 - 50 % Final     Monocyte Rel %   Date Value Ref Range Status   09/17/2019 10 Not Estab. % Final   07/13/2019 7.9 0 - 15 % Final     Eosinophil Rel %   Date Value Ref Range Status   09/17/2019 4 Not Estab. % Final     Eosinophil %   Date Value Ref Range Status   07/13/2019 0.3 0 - 7 % Final     Basophil Rel %   Date Value Ref Range Status   09/17/2019 0 Not Estab. % Final   07/13/2019 0.1 0 - 2 % Final     Immature Grans %   Date Value Ref Range Status   07/13/2019 0.4 (H) 0 % Final     Neutrophils Absolute   Date Value Ref Range Status   09/17/2019 4.1 1.4 - 7.0 x10E3/uL Final   07/13/2019 16.10 (H) 2.0 - 8.8 10*3/uL Final     Lymphocytes Absolute   Date Value Ref Range Status   09/17/2019 1.9 0.7 - 3.1 x10E3/uL Final   07/13/2019 1.24 0.7 - 5.5 10*3/uL Final     Monocytes Absolute   Date Value Ref Range Status   09/17/2019 0.7 0.1 - 0.9 x10E3/uL Final   07/13/2019 1.50 0.0 - 1.7 10*3/uL Final     Eosinophils Absolute   Date Value Ref Range Status   09/17/2019 0.3 0.0 - 0.4 x10E3/uL Final   07/13/2019 0.06 0.0 - 0.8 10*3/uL Final     Basophils Absolute   Date Value Ref Range Status   09/17/2019 0.0 0.0 - 0.2 x10E3/uL Final   07/13/2019 0.02 0.0 - 0.2 10*3/uL Final     Immature Grans, Absolute   Date Value Ref Range Status   07/13/2019 0.07 <1 10*3/uL Final     nRBC   Date Value Ref Range Status   07/13/2019 0 0 /100(WBC) Final       Lab Results   Component Value Date    HGBA1C 6.9 (H) 02/17/2022       Lab Results   Component Value Date    [id number] 510 10/13/2021       TSH   Date Value Ref Range Status   09/17/2019 1.470 0.450 - 4.500 uIU/mL Final       No results found for: CHOL  Lab Results   Component Value Date    TRIG 110 02/17/2022     Lab Results   Component Value Date    HDL 78 02/17/2022     Lab Results   Component Value Date     (H) 02/17/2022     Lab Results   Component Value Date    VLDL 19 02/17/2022     No  results found for: LDLHDL      Procedures    Assessment & Plan   Problems Addressed this Visit     Diabetes mellitus (HCC)    Relevant Orders    Comprehensive Metabolic Panel    Lipid Panel With / Chol / HDL Ratio    Hemoglobin A1c    Hyperlipidemia    Relevant Orders    Comprehensive Metabolic Panel    Lipid Panel With / Chol / HDL Ratio    Hypertension - Primary      Diagnoses       Codes Comments    Primary hypertension    -  Primary ICD-10-CM: I10  ICD-9-CM: 401.9     Type 2 diabetes mellitus without complication, without long-term current use of insulin (HCC)     ICD-10-CM: E11.9  ICD-9-CM: 250.00     Mixed hyperlipidemia     ICD-10-CM: E78.2  ICD-9-CM: 272.2         DM2.  Controlled.  Check A1c.    HTN.  No orthostasis. Check CMP.    Hyperlipidmeia.  Check FLP.  Continue simvastatin.      Orders Placed This Encounter   Procedures   • Comprehensive Metabolic Panel     Order Specific Question:   Release to patient     Answer:   Immediate   • Lipid Panel With / Chol / HDL Ratio     Order Specific Question:   Release to patient     Answer:   Immediate   • Hemoglobin A1c     Order Specific Question:   Release to patient     Answer:   Immediate       Current Outpatient Medications   Medication Sig Dispense Refill   • Cetirizine HCl 10 MG capsule Take  by mouth.     • lisinopril (PRINIVIL,ZESTRIL) 20 MG tablet TAKE 1 TABLET EVERY DAY 90 tablet 3   • meloxicam (MOBIC) 15 MG tablet Take 1 tablet by mouth Daily. 90 tablet 2   • pioglitazone (ACTOS) 45 MG tablet Take 1 tablet by mouth Daily. 90 tablet 2   • simvastatin (ZOCOR) 40 MG tablet Take 1 tablet by mouth Every Night. 90 tablet 3   • vitamin C (ASCORBIC ACID) 500 MG tablet Take 1,000 mg by mouth Daily.       No current facility-administered medications for this visit.       Raúl Jarquin had no medications administered during this visit.    Return in about 4 months (around 10/16/2022).    There are no Patient Instructions on file for this visit.

## 2022-06-17 LAB
ALBUMIN SERPL-MCNC: 4.4 G/DL (ref 3.8–4.9)
ALBUMIN/GLOB SERPL: 1.8 {RATIO} (ref 1.2–2.2)
ALP SERPL-CCNC: 72 IU/L (ref 44–121)
ALT SERPL-CCNC: 23 IU/L (ref 0–44)
AST SERPL-CCNC: 18 IU/L (ref 0–40)
BILIRUB SERPL-MCNC: 0.6 MG/DL (ref 0–1.2)
BUN SERPL-MCNC: 21 MG/DL (ref 6–24)
BUN/CREAT SERPL: 21 (ref 9–20)
CALCIUM SERPL-MCNC: 9.5 MG/DL (ref 8.7–10.2)
CHLORIDE SERPL-SCNC: 102 MMOL/L (ref 96–106)
CHOLEST SERPL-MCNC: 186 MG/DL (ref 100–199)
CHOLEST/HDLC SERPL: 3 RATIO (ref 0–5)
CO2 SERPL-SCNC: 24 MMOL/L (ref 20–29)
CREAT SERPL-MCNC: 0.99 MG/DL (ref 0.76–1.27)
EGFRCR SERPLBLD CKD-EPI 2021: 90 ML/MIN/1.73
GLOBULIN SER CALC-MCNC: 2.4 G/DL (ref 1.5–4.5)
GLUCOSE SERPL-MCNC: 117 MG/DL (ref 65–99)
HBA1C MFR BLD: 7.2 % (ref 4.8–5.6)
HDLC SERPL-MCNC: 61 MG/DL
LDLC SERPL CALC-MCNC: 104 MG/DL (ref 0–99)
POTASSIUM SERPL-SCNC: 4.7 MMOL/L (ref 3.5–5.2)
PROT SERPL-MCNC: 6.8 G/DL (ref 6–8.5)
SODIUM SERPL-SCNC: 141 MMOL/L (ref 134–144)
TRIGL SERPL-MCNC: 120 MG/DL (ref 0–149)
VLDLC SERPL CALC-MCNC: 21 MG/DL (ref 5–40)

## 2022-09-14 DIAGNOSIS — E78.2 MIXED HYPERLIPIDEMIA: ICD-10-CM

## 2022-09-15 RX ORDER — SIMVASTATIN 40 MG
40 TABLET ORAL NIGHTLY
Qty: 90 TABLET | Refills: 3 | Status: SHIPPED | OUTPATIENT
Start: 2022-09-15

## 2022-11-21 ENCOUNTER — OFFICE VISIT (OUTPATIENT)
Dept: FAMILY MEDICINE CLINIC | Facility: CLINIC | Age: 57
End: 2022-11-21

## 2022-11-21 VITALS
OXYGEN SATURATION: 96 % | TEMPERATURE: 98 F | HEIGHT: 68 IN | DIASTOLIC BLOOD PRESSURE: 70 MMHG | BODY MASS INDEX: 38.98 KG/M2 | WEIGHT: 257.2 LBS | HEART RATE: 87 BPM | SYSTOLIC BLOOD PRESSURE: 122 MMHG

## 2022-11-21 DIAGNOSIS — I10 PRIMARY HYPERTENSION: Primary | ICD-10-CM

## 2022-11-21 DIAGNOSIS — E11.9 TYPE 2 DIABETES MELLITUS WITHOUT COMPLICATION, WITHOUT LONG-TERM CURRENT USE OF INSULIN: ICD-10-CM

## 2022-11-21 PROCEDURE — 99214 OFFICE O/P EST MOD 30 MIN: CPT | Performed by: FAMILY MEDICINE

## 2022-11-21 RX ORDER — PIOGLITAZONEHYDROCHLORIDE 45 MG/1
45 TABLET ORAL DAILY
Qty: 90 TABLET | Refills: 2 | Status: SHIPPED | OUTPATIENT
Start: 2022-11-21

## 2022-11-21 NOTE — PROGRESS NOTES
Chief Complaint   Patient presents with   • Diabetes       Subjective   Raúl Jarquin is a 57 y.o. male.     History of Present Illness   F/U DM2.   On meds regulalrly.  F/U HTn.  Doing well w ith meds.      The following portions of the patient's history were reviewed and updated as appropriate: allergies, current medications, past family history, past medical history, past social history, past surgical history and problem list.    Review of Systems   Constitutional: Negative for appetite change and fatigue.   HENT: Negative for nosebleeds and sore throat.    Eyes: Negative for blurred vision and visual disturbance.   Respiratory: Negative for shortness of breath and wheezing.    Cardiovascular: Negative for chest pain and leg swelling.   Gastrointestinal: Negative for abdominal distention and abdominal pain.   Endocrine: Negative for cold intolerance and polyuria.   Genitourinary: Negative for dysuria and hematuria.   Musculoskeletal: Negative for arthralgias and myalgias.   Skin: Negative for color change and rash.   Neurological: Negative for weakness and confusion.   Psychiatric/Behavioral: Negative for agitation and depressed mood.       Patient Active Problem List   Diagnosis   • Hypertension   • Hyperlipidemia   • Diabetes mellitus (HCC)   • Carpal tunnel syndrome   • BPH (benign prostatic hyperplasia)   • Arthritis   • Leukemoid reaction   • Acute right-sided low back pain   • Hematuria   • Sore throat   • Nephrolithiasis   • Encounter for annual health examination   • Sebaceous cyst   • Encounter for hepatitis C screening test for low risk patient       No Known Allergies      Current Outpatient Medications:   •  Cetirizine HCl 10 MG capsule, Take  by mouth., Disp: , Rfl:   •  lisinopril (PRINIVIL,ZESTRIL) 20 MG tablet, TAKE 1 TABLET EVERY DAY, Disp: 90 tablet, Rfl: 3  •  meloxicam (MOBIC) 15 MG tablet, Take 1 tablet by mouth Daily., Disp: 90 tablet, Rfl: 2  •  pioglitazone (ACTOS) 45 MG tablet, Take 1  tablet by mouth Daily., Disp: 90 tablet, Rfl: 2  •  simvastatin (ZOCOR) 40 MG tablet, TAKE 1 TABLET BY MOUTH EVERY NIGHT., Disp: 90 tablet, Rfl: 3  •  vitamin C (ASCORBIC ACID) 500 MG tablet, Take 1,000 mg by mouth Daily., Disp: , Rfl:     Past Medical History:   Diagnosis Date   • Acute bronchitis    • Arthritis    • BPH (benign prostatic hyperplasia)    • Carpal tunnel syndrome    • Colon cancer screening    • Diabetes mellitus (HCC)    • Hyperlipidemia    • Hypertension    • Immunization deficiency    • Urinary urgency        Past Surgical History:   Procedure Laterality Date   • COLONOSCOPY  2019    normal   • HAND SURGERY Right     Right Thumb surgery        Family History   Problem Relation Age of Onset   • Diabetes Mother    • Hypertension Mother        Social History     Tobacco Use   • Smoking status: Never   • Smokeless tobacco: Never   Substance Use Topics   • Alcohol use: Yes            Objective     Vitals:    11/21/22 1536   BP: 122/70   Pulse: 87   Temp: 98 °F (36.7 °C)   SpO2: 96%     Body mass index is 39.12 kg/m².    Physical Exam  Vitals reviewed.   Constitutional:       Appearance: He is well-developed. He is not diaphoretic.   HENT:      Head: Normocephalic and atraumatic.   Eyes:      General: No scleral icterus.     Pupils: Pupils are equal, round, and reactive to light.   Neck:      Thyroid: No thyromegaly.   Cardiovascular:      Rate and Rhythm: Normal rate and regular rhythm.      Heart sounds: No murmur heard.    No friction rub. No gallop.   Pulmonary:      Effort: Pulmonary effort is normal. No respiratory distress.      Breath sounds: No wheezing or rales.   Chest:      Chest wall: No tenderness.   Abdominal:      General: Bowel sounds are normal. There is no distension.      Palpations: Abdomen is soft.      Tenderness: There is no abdominal tenderness.   Musculoskeletal:         General: No deformity. Normal range of motion.   Lymphadenopathy:      Cervical: No cervical adenopathy.    Skin:     General: Skin is warm and dry.      Findings: No rash.   Neurological:      Cranial Nerves: No cranial nerve deficit.      Motor: No abnormal muscle tone.         Lab Results   Component Value Date    GLUCOSE 117 (H) 06/16/2022    BUN 21 06/16/2022    CREATININE 0.99 06/16/2022    EGFRIFNONA 101 02/17/2022    EGFRIFAFRI 116 02/17/2022    BCR 21 (H) 06/16/2022    K 4.7 06/16/2022    CO2 24 06/16/2022    CALCIUM 9.5 06/16/2022    PROTENTOTREF 6.8 06/16/2022    ALBUMIN 4.4 06/16/2022    LABIL2 1.8 06/16/2022    AST 18 06/16/2022    ALT 23 06/16/2022       WBC   Date Value Ref Range Status   09/17/2019 7.0 3.4 - 10.8 x10E3/uL Final   07/13/2019 18.99 (H) 4.5 - 11.0 10*3/uL Final     RBC   Date Value Ref Range Status   09/17/2019 5.09 4.14 - 5.80 x10E6/uL Final   07/13/2019 4.89 4.5 - 5.9 10*6/uL Final     Hemoglobin   Date Value Ref Range Status   09/17/2019 14.8 13.0 - 17.7 g/dL Final   07/13/2019 14.0 13.5 - 17.5 g/dL Final     Hematocrit   Date Value Ref Range Status   09/17/2019 43.1 37.5 - 51.0 % Final   07/13/2019 44.0 41.0 - 53.0 % Final     MCV   Date Value Ref Range Status   09/17/2019 85 79 - 97 fL Final   07/13/2019 90.0 80.0 - 100.0 fL Final     MCH   Date Value Ref Range Status   09/17/2019 29.1 26.6 - 33.0 pg Final   07/13/2019 28.6 26.0 - 34.0 pg Final     MCHC   Date Value Ref Range Status   09/17/2019 34.3 31.5 - 35.7 g/dL Final   07/13/2019 31.8 31.0 - 37.0 g/dL Final     RDW   Date Value Ref Range Status   09/17/2019 13.8 12.3 - 15.4 % Final   07/13/2019 14.0 12.0 - 16.8 % Final     MPV   Date Value Ref Range Status   07/13/2019 11.0 (H) 6.7 - 10.8 fL Final     Platelets   Date Value Ref Range Status   09/17/2019 274 150 - 450 x10E3/uL Final   07/13/2019 219 140 - 440 10*3/uL Final     Neutrophil Rel %   Date Value Ref Range Status   09/17/2019 59 Not Estab. % Final   07/13/2019 84.8 (H) 45 - 80 % Final     Lymphocyte Rel %   Date Value Ref Range Status   09/17/2019 27 Not Estab. % Final    07/13/2019 6.5 (L) 15 - 50 % Final     Monocyte Rel %   Date Value Ref Range Status   09/17/2019 10 Not Estab. % Final   07/13/2019 7.9 0 - 15 % Final     Eosinophil Rel %   Date Value Ref Range Status   09/17/2019 4 Not Estab. % Final     Eosinophil %   Date Value Ref Range Status   07/13/2019 0.3 0 - 7 % Final     Basophil Rel %   Date Value Ref Range Status   09/17/2019 0 Not Estab. % Final   07/13/2019 0.1 0 - 2 % Final     Immature Grans %   Date Value Ref Range Status   07/13/2019 0.4 (H) 0 % Final     Neutrophils Absolute   Date Value Ref Range Status   09/17/2019 4.1 1.4 - 7.0 x10E3/uL Final   07/13/2019 16.10 (H) 2.0 - 8.8 10*3/uL Final     Lymphocytes Absolute   Date Value Ref Range Status   09/17/2019 1.9 0.7 - 3.1 x10E3/uL Final   07/13/2019 1.24 0.7 - 5.5 10*3/uL Final     Monocytes Absolute   Date Value Ref Range Status   09/17/2019 0.7 0.1 - 0.9 x10E3/uL Final   07/13/2019 1.50 0.0 - 1.7 10*3/uL Final     Eosinophils Absolute   Date Value Ref Range Status   09/17/2019 0.3 0.0 - 0.4 x10E3/uL Final   07/13/2019 0.06 0.0 - 0.8 10*3/uL Final     Basophils Absolute   Date Value Ref Range Status   09/17/2019 0.0 0.0 - 0.2 x10E3/uL Final   07/13/2019 0.02 0.0 - 0.2 10*3/uL Final     Immature Grans, Absolute   Date Value Ref Range Status   07/13/2019 0.07 <1 10*3/uL Final     nRBC   Date Value Ref Range Status   07/13/2019 0 0 /100(WBC) Final       Lab Results   Component Value Date    HGBA1C 7.2 (H) 06/16/2022       Lab Results   Component Value Date    [id number] 510 10/13/2021       TSH   Date Value Ref Range Status   09/17/2019 1.470 0.450 - 4.500 uIU/mL Final       No results found for: CHOL  Lab Results   Component Value Date    TRIG 120 06/16/2022     Lab Results   Component Value Date    HDL 61 06/16/2022     Lab Results   Component Value Date     (H) 06/16/2022     Lab Results   Component Value Date    VLDL 21 06/16/2022     No results found for: LDLHDL      Procedures    Assessment & Plan    Problems Addressed this Visit     Diabetes mellitus (HCC)    Relevant Medications    pioglitazone (ACTOS) 45 MG tablet    Hypertension - Primary   Diagnoses       Codes Comments    Primary hypertension    -  Primary ICD-10-CM: I10  ICD-9-CM: 401.9     Type 2 diabetes mellitus without complication, without long-term current use of insulin (HCC)     ICD-10-CM: E11.9  ICD-9-CM: 250.00       DM2.  Controlled.  RF wenceslao.  Check A1c, CMP.   HTn.  Controlle.d  Continue lisinopril.   Declines covid booster and flu.      No orders of the defined types were placed in this encounter.      Current Outpatient Medications   Medication Sig Dispense Refill   • Cetirizine HCl 10 MG capsule Take  by mouth.     • lisinopril (PRINIVIL,ZESTRIL) 20 MG tablet TAKE 1 TABLET EVERY DAY 90 tablet 3   • meloxicam (MOBIC) 15 MG tablet Take 1 tablet by mouth Daily. 90 tablet 2   • pioglitazone (ACTOS) 45 MG tablet Take 1 tablet by mouth Daily. 90 tablet 2   • simvastatin (ZOCOR) 40 MG tablet TAKE 1 TABLET BY MOUTH EVERY NIGHT. 90 tablet 3   • vitamin C (ASCORBIC ACID) 500 MG tablet Take 1,000 mg by mouth Daily.       No current facility-administered medications for this visit.       Raúl Jarquin had no medications administered during this visit.    No follow-ups on file.    There are no Patient Instructions on file for this visit.

## 2022-11-22 LAB
ALBUMIN SERPL-MCNC: 4.7 G/DL (ref 3.5–5.2)
ALBUMIN/GLOB SERPL: 2.1 G/DL
ALP SERPL-CCNC: 77 U/L (ref 39–117)
ALT SERPL-CCNC: 29 U/L (ref 1–41)
AST SERPL-CCNC: 16 U/L (ref 1–40)
BILIRUB SERPL-MCNC: 0.3 MG/DL (ref 0–1.2)
BUN SERPL-MCNC: 16 MG/DL (ref 6–20)
BUN/CREAT SERPL: 19 (ref 7–25)
CALCIUM SERPL-MCNC: 9.9 MG/DL (ref 8.6–10.5)
CHLORIDE SERPL-SCNC: 100 MMOL/L (ref 98–107)
CO2 SERPL-SCNC: 31 MMOL/L (ref 22–29)
CREAT SERPL-MCNC: 0.84 MG/DL (ref 0.76–1.27)
EGFRCR SERPLBLD CKD-EPI 2021: 103 ML/MIN/1.73
GLOBULIN SER CALC-MCNC: 2.2 GM/DL
GLUCOSE SERPL-MCNC: 120 MG/DL (ref 65–99)
HBA1C MFR BLD: 7.4 % (ref 4.8–5.6)
POTASSIUM SERPL-SCNC: 4.9 MMOL/L (ref 3.5–5.2)
PROT SERPL-MCNC: 6.9 G/DL (ref 6–8.5)
SODIUM SERPL-SCNC: 141 MMOL/L (ref 136–145)

## 2022-12-29 DIAGNOSIS — M19.90 ARTHRITIS: ICD-10-CM

## 2022-12-29 RX ORDER — MELOXICAM 15 MG/1
15 TABLET ORAL DAILY
Qty: 90 TABLET | Refills: 2 | Status: SHIPPED | OUTPATIENT
Start: 2022-12-29

## 2022-12-29 NOTE — TELEPHONE ENCOUNTER
Rx Refill Note  Requested Prescriptions     Pending Prescriptions Disp Refills   • meloxicam (MOBIC) 15 MG tablet 90 tablet 2     Sig: Take 1 tablet by mouth Daily.      Last office visit with prescribing clinician: 11/21/2022   Last telemedicine visit with prescribing clinician: 3/13/2023   Next office visit with prescribing clinician: 3/13/2023

## 2022-12-29 NOTE — TELEPHONE ENCOUNTER
Caller: Britton Raúl    Relationship: Self    Best call back number: [phone]    Requested Prescriptions:   Requested Prescriptions     Pending Prescriptions Disp Refills   • meloxicam (MOBIC) 15 MG tablet 90 tablet 2     Sig: Take 1 tablet by mouth Daily.        Pharmacy where request should be sent: [facility] [address] [place] [address] [facility] - [phone]  - [phone] FX     Additional details provided by patient: PATIENT HAS BEEN OUT OVER A WEEK.     Does the patient have less than a 3 day supply:  [x] Yes  [] No    Would you like a call back once the refill request has been completed: [x] Yes [] No    If the office needs to give you a call back, can they leave a voicemail: [x] Yes [] No    Aruna Lennon Rep   12/29/22 11:44 EST

## 2023-02-07 ENCOUNTER — OFFICE VISIT (OUTPATIENT)
Dept: FAMILY MEDICINE CLINIC | Facility: CLINIC | Age: 58
End: 2023-02-07
Payer: COMMERCIAL

## 2023-02-07 VITALS
BODY MASS INDEX: 39.01 KG/M2 | SYSTOLIC BLOOD PRESSURE: 128 MMHG | OXYGEN SATURATION: 96 % | HEART RATE: 88 BPM | TEMPERATURE: 98.5 F | HEIGHT: 68 IN | WEIGHT: 257.4 LBS | DIASTOLIC BLOOD PRESSURE: 78 MMHG

## 2023-02-07 DIAGNOSIS — J40 BRONCHITIS: ICD-10-CM

## 2023-02-07 DIAGNOSIS — J30.1 SEASONAL ALLERGIC RHINITIS DUE TO POLLEN: Primary | ICD-10-CM

## 2023-02-07 PROCEDURE — 99214 OFFICE O/P EST MOD 30 MIN: CPT | Performed by: FAMILY MEDICINE

## 2023-02-07 RX ORDER — IPRATROPIUM BROMIDE 21 UG/1
2 SPRAY, METERED NASAL 3 TIMES DAILY
Qty: 30 ML | Refills: 12 | Status: SHIPPED | OUTPATIENT
Start: 2023-02-07

## 2023-02-07 RX ORDER — AZITHROMYCIN 250 MG/1
TABLET, FILM COATED ORAL
Qty: 6 TABLET | Refills: 0 | Status: SHIPPED | OUTPATIENT
Start: 2023-02-07 | End: 2023-02-12

## 2023-02-07 NOTE — PROGRESS NOTES
Chief Complaint   Patient presents with   • Cough       Subjective   Raúl Jarquin is a 57 y.o. male.     History of Present Illness   C/o 3 week history of nasal drainage and cough.  Worse at night.  No fever. Hx of having trouble with allergies for years.     The following portions of the patient's history were reviewed and updated as appropriate: allergies, current medications, past family history, past medical history, past social history, past surgical history and problem list.    Review of Systems   Constitutional: Negative for appetite change and fatigue.   HENT: Positive for rhinorrhea. Negative for nosebleeds and sore throat.    Eyes: Negative for blurred vision and visual disturbance.   Respiratory: Positive for cough. Negative for shortness of breath and wheezing.    Cardiovascular: Negative for chest pain and leg swelling.   Gastrointestinal: Negative for abdominal distention and abdominal pain.   Endocrine: Negative for cold intolerance and polyuria.   Genitourinary: Negative for dysuria and hematuria.   Musculoskeletal: Negative for arthralgias and myalgias.   Skin: Negative for color change and rash.   Neurological: Negative for weakness and confusion.   Psychiatric/Behavioral: Negative for agitation and depressed mood.       Patient Active Problem List   Diagnosis   • Hypertension   • Hyperlipidemia   • Diabetes mellitus (HCC)   • Carpal tunnel syndrome   • BPH (benign prostatic hyperplasia)   • Arthritis   • Leukemoid reaction   • Acute right-sided low back pain   • Hematuria   • Sore throat   • Nephrolithiasis   • Encounter for annual health examination   • Sebaceous cyst   • Encounter for hepatitis C screening test for low risk patient   • Seasonal allergic rhinitis due to pollen   • Bronchitis       No Known Allergies      Current Outpatient Medications:   •  Cetirizine HCl 10 MG capsule, Take  by mouth., Disp: , Rfl:   •  lisinopril (PRINIVIL,ZESTRIL) 20 MG tablet, TAKE 1 TABLET EVERY DAY,  Disp: 90 tablet, Rfl: 3  •  meloxicam (MOBIC) 15 MG tablet, Take 1 tablet by mouth Daily., Disp: 90 tablet, Rfl: 2  •  pioglitazone (ACTOS) 45 MG tablet, Take 1 tablet by mouth Daily., Disp: 90 tablet, Rfl: 2  •  simvastatin (ZOCOR) 40 MG tablet, TAKE 1 TABLET BY MOUTH EVERY NIGHT., Disp: 90 tablet, Rfl: 3  •  vitamin C (ASCORBIC ACID) 500 MG tablet, Take 1,000 mg by mouth Daily., Disp: , Rfl:     Past Medical History:   Diagnosis Date   • Acute bronchitis    • Arthritis    • BPH (benign prostatic hyperplasia)    • Carpal tunnel syndrome    • Colon cancer screening    • Diabetes mellitus (HCC)    • Hyperlipidemia    • Hypertension    • Immunization deficiency    • Urinary urgency        Past Surgical History:   Procedure Laterality Date   • COLONOSCOPY  2019    normal   • HAND SURGERY Right     Right Thumb surgery        Family History   Problem Relation Age of Onset   • Diabetes Mother    • Hypertension Mother        Social History     Tobacco Use   • Smoking status: Never   • Smokeless tobacco: Never   Substance Use Topics   • Alcohol use: Yes            Objective     Vitals:    02/07/23 1318   BP: 128/78   Pulse: 88   Temp: 98.5 °F (36.9 °C)   SpO2: 96%     Body mass index is 39.15 kg/m².    Physical Exam  Vitals reviewed.   Constitutional:       Appearance: He is well-developed. He is not diaphoretic.   HENT:      Head: Normocephalic and atraumatic.   Eyes:      General: No scleral icterus.     Pupils: Pupils are equal, round, and reactive to light.   Neck:      Thyroid: No thyromegaly.   Cardiovascular:      Rate and Rhythm: Normal rate and regular rhythm.      Heart sounds: No murmur heard.    No friction rub. No gallop.   Pulmonary:      Effort: Pulmonary effort is normal. No respiratory distress.      Breath sounds: No wheezing or rales.   Chest:      Chest wall: No tenderness.   Abdominal:      General: Bowel sounds are normal. There is no distension.      Palpations: Abdomen is soft.      Tenderness: There  is no abdominal tenderness.   Musculoskeletal:         General: No deformity. Normal range of motion.   Lymphadenopathy:      Cervical: No cervical adenopathy.   Skin:     General: Skin is warm and dry.      Findings: No rash.   Neurological:      Cranial Nerves: No cranial nerve deficit.      Motor: No abnormal muscle tone.         Lab Results   Component Value Date    GLUCOSE 120 (H) 11/21/2022    BUN 16 11/21/2022    CREATININE 0.84 11/21/2022    EGFRIFNONA 101 02/17/2022    EGFRIFAFRI 116 02/17/2022    BCR 19.0 11/21/2022    K 4.9 11/21/2022    CO2 31.0 (H) 11/21/2022    CALCIUM 9.9 11/21/2022    PROTENTOTREF 6.9 11/21/2022    ALBUMIN 4.70 11/21/2022    LABIL2 2.1 11/21/2022    AST 16 11/21/2022    ALT 29 11/21/2022       WBC   Date Value Ref Range Status   09/17/2019 7.0 3.4 - 10.8 x10E3/uL Final   07/13/2019 18.99 (H) 4.5 - 11.0 10*3/uL Final     RBC   Date Value Ref Range Status   09/17/2019 5.09 4.14 - 5.80 x10E6/uL Final   07/13/2019 4.89 4.5 - 5.9 10*6/uL Final     Hemoglobin   Date Value Ref Range Status   09/17/2019 14.8 13.0 - 17.7 g/dL Final   07/13/2019 14.0 13.5 - 17.5 g/dL Final     Hematocrit   Date Value Ref Range Status   09/17/2019 43.1 37.5 - 51.0 % Final   07/13/2019 44.0 41.0 - 53.0 % Final     MCV   Date Value Ref Range Status   09/17/2019 85 79 - 97 fL Final   07/13/2019 90.0 80.0 - 100.0 fL Final     MCH   Date Value Ref Range Status   09/17/2019 29.1 26.6 - 33.0 pg Final   07/13/2019 28.6 26.0 - 34.0 pg Final     MCHC   Date Value Ref Range Status   09/17/2019 34.3 31.5 - 35.7 g/dL Final   07/13/2019 31.8 31.0 - 37.0 g/dL Final     RDW   Date Value Ref Range Status   09/17/2019 13.8 12.3 - 15.4 % Final   07/13/2019 14.0 12.0 - 16.8 % Final     MPV   Date Value Ref Range Status   07/13/2019 11.0 (H) 6.7 - 10.8 fL Final     Platelets   Date Value Ref Range Status   09/17/2019 274 150 - 450 x10E3/uL Final   07/13/2019 219 140 - 440 10*3/uL Final     Neutrophil Rel %   Date Value Ref Range  Status   09/17/2019 59 Not Estab. % Final   07/13/2019 84.8 (H) 45 - 80 % Final     Lymphocyte Rel %   Date Value Ref Range Status   09/17/2019 27 Not Estab. % Final   07/13/2019 6.5 (L) 15 - 50 % Final     Monocyte Rel %   Date Value Ref Range Status   09/17/2019 10 Not Estab. % Final   07/13/2019 7.9 0 - 15 % Final     Eosinophil Rel %   Date Value Ref Range Status   09/17/2019 4 Not Estab. % Final     Eosinophil %   Date Value Ref Range Status   07/13/2019 0.3 0 - 7 % Final     Basophil Rel %   Date Value Ref Range Status   09/17/2019 0 Not Estab. % Final   07/13/2019 0.1 0 - 2 % Final     Immature Grans %   Date Value Ref Range Status   07/13/2019 0.4 (H) 0 % Final     Neutrophils Absolute   Date Value Ref Range Status   09/17/2019 4.1 1.4 - 7.0 x10E3/uL Final   07/13/2019 16.10 (H) 2.0 - 8.8 10*3/uL Final     Lymphocytes Absolute   Date Value Ref Range Status   09/17/2019 1.9 0.7 - 3.1 x10E3/uL Final   07/13/2019 1.24 0.7 - 5.5 10*3/uL Final     Monocytes Absolute   Date Value Ref Range Status   09/17/2019 0.7 0.1 - 0.9 x10E3/uL Final   07/13/2019 1.50 0.0 - 1.7 10*3/uL Final     Eosinophils Absolute   Date Value Ref Range Status   09/17/2019 0.3 0.0 - 0.4 x10E3/uL Final   07/13/2019 0.06 0.0 - 0.8 10*3/uL Final     Basophils Absolute   Date Value Ref Range Status   09/17/2019 0.0 0.0 - 0.2 x10E3/uL Final   07/13/2019 0.02 0.0 - 0.2 10*3/uL Final     Immature Grans, Absolute   Date Value Ref Range Status   07/13/2019 0.07 <1 10*3/uL Final     nRBC   Date Value Ref Range Status   07/13/2019 0 0 /100(WBC) Final       Lab Results   Component Value Date    HGBA1C 7.40 (H) 11/21/2022       Lab Results   Component Value Date    [id number] 510 10/13/2021       TSH   Date Value Ref Range Status   09/17/2019 1.470 0.450 - 4.500 uIU/mL Final       No results found for: CHOL  Lab Results   Component Value Date    TRIG 120 06/16/2022     Lab Results   Component Value Date    HDL 61 06/16/2022     Lab Results   Component  Value Date     (H) 06/16/2022     Lab Results   Component Value Date    VLDL 21 06/16/2022     No results found for: LDLHDL      Procedures    Assessment & Plan   Problems Addressed this Visit     Bronchitis    Seasonal allergic rhinitis due to pollen - Primary   Diagnoses       Codes Comments    Seasonal allergic rhinitis due to pollen    -  Primary ICD-10-CM: J30.1  ICD-9-CM: 477.0     Bronchitis     ICD-10-CM: J40  ICD-9-CM: 490       AR uncontrolled, chronic.  Start ipratropium nasal 2 /nostril TID.  Contine zyrtec.   Bronchitis.  Tx with zpack to cover atypicals.     No orders of the defined types were placed in this encounter.      Current Outpatient Medications   Medication Sig Dispense Refill   • Cetirizine HCl 10 MG capsule Take  by mouth.     • lisinopril (PRINIVIL,ZESTRIL) 20 MG tablet TAKE 1 TABLET EVERY DAY 90 tablet 3   • meloxicam (MOBIC) 15 MG tablet Take 1 tablet by mouth Daily. 90 tablet 2   • pioglitazone (ACTOS) 45 MG tablet Take 1 tablet by mouth Daily. 90 tablet 2   • simvastatin (ZOCOR) 40 MG tablet TAKE 1 TABLET BY MOUTH EVERY NIGHT. 90 tablet 3   • vitamin C (ASCORBIC ACID) 500 MG tablet Take 1,000 mg by mouth Daily.       No current facility-administered medications for this visit.       Raúl Jarquin had no medications administered during this visit.    No follow-ups on file.    There are no Patient Instructions on file for this visit.

## 2023-03-13 ENCOUNTER — OFFICE VISIT (OUTPATIENT)
Dept: FAMILY MEDICINE CLINIC | Facility: CLINIC | Age: 58
End: 2023-03-13
Payer: COMMERCIAL

## 2023-03-13 VITALS
TEMPERATURE: 97.6 F | DIASTOLIC BLOOD PRESSURE: 70 MMHG | SYSTOLIC BLOOD PRESSURE: 118 MMHG | WEIGHT: 260 LBS | OXYGEN SATURATION: 96 % | HEIGHT: 68 IN | BODY MASS INDEX: 39.4 KG/M2 | HEART RATE: 69 BPM

## 2023-03-13 DIAGNOSIS — R35.0 BENIGN PROSTATIC HYPERPLASIA WITH URINARY FREQUENCY: ICD-10-CM

## 2023-03-13 DIAGNOSIS — E11.9 TYPE 2 DIABETES MELLITUS WITHOUT COMPLICATION, WITHOUT LONG-TERM CURRENT USE OF INSULIN: ICD-10-CM

## 2023-03-13 DIAGNOSIS — E78.2 MIXED HYPERLIPIDEMIA: ICD-10-CM

## 2023-03-13 DIAGNOSIS — N40.1 BENIGN PROSTATIC HYPERPLASIA WITH URINARY FREQUENCY: ICD-10-CM

## 2023-03-13 DIAGNOSIS — I10 PRIMARY HYPERTENSION: ICD-10-CM

## 2023-03-13 DIAGNOSIS — Z00.00 ENCOUNTER FOR ANNUAL HEALTH EXAMINATION: Primary | ICD-10-CM

## 2023-03-13 PROCEDURE — 99396 PREV VISIT EST AGE 40-64: CPT | Performed by: FAMILY MEDICINE

## 2023-03-13 PROCEDURE — 99214 OFFICE O/P EST MOD 30 MIN: CPT | Performed by: FAMILY MEDICINE

## 2023-03-13 NOTE — PROGRESS NOTES
Patient here for annual physical exam    Subjective   Raúl Jarquin is a 58 y.o. male.     History of Present Illness   58 year old WM here for annual.    The following portions of the patient's history were reviewed and updated as appropriate: allergies, current medications, past family history, past medical history, past social history, past surgical history and problem list    Last colonoscopy:2019  Optometry:utd  Dentist UTD  Last PSA(if applicable):needed.    Last mammo(if applicable):na    fU DM2.  Doing well with meds.  uNCONTROLLED WITH a1C 7.4 FROM 11/22.    Immunization History   Administered Date(s) Administered   • COVID-19 (PFIZER) PURPLE CAP 08/28/2021, 09/24/2021   • Hepatitis A 05/18/2018, 12/06/2018, 07/17/2019   • Pneumococcal Polysaccharide (PPSV23) 11/14/2009       Review of Systems   Constitutional: Negative for appetite change and fatigue.   HENT: Negative for nosebleeds and sore throat.    Eyes: Negative for blurred vision and visual disturbance.   Respiratory: Negative for shortness of breath and wheezing.    Cardiovascular: Negative for chest pain and leg swelling.   Gastrointestinal: Negative for abdominal distention and abdominal pain.   Endocrine: Negative for cold intolerance and polyuria.   Genitourinary: Negative for dysuria and hematuria.   Musculoskeletal: Negative for arthralgias and myalgias.   Skin: Negative for color change and rash.   Neurological: Negative for weakness and confusion.   Psychiatric/Behavioral: Negative for agitation and depressed mood.       Patient Active Problem List   Diagnosis   • Hypertension   • Hyperlipidemia   • Diabetes mellitus (HCC)   • Carpal tunnel syndrome   • BPH (benign prostatic hyperplasia)   • Arthritis   • Leukemoid reaction   • Acute right-sided low back pain   • Hematuria   • Sore throat   • Nephrolithiasis   • Encounter for annual health examination   • Sebaceous cyst   • Encounter for hepatitis C screening test for low risk patient    • Seasonal allergic rhinitis due to pollen   • Bronchitis       No Known Allergies      Current Outpatient Medications:   •  Cetirizine HCl 10 MG capsule, Take  by mouth., Disp: , Rfl:   •  ipratropium (ATROVENT) 0.03 % nasal spray, 2 sprays into the nostril(s) as directed by provider 3 (Three) Times a Day., Disp: 30 mL, Rfl: 12  •  lisinopril (PRINIVIL,ZESTRIL) 20 MG tablet, TAKE 1 TABLET EVERY DAY, Disp: 90 tablet, Rfl: 3  •  meloxicam (MOBIC) 15 MG tablet, Take 1 tablet by mouth Daily., Disp: 90 tablet, Rfl: 2  •  pioglitazone (ACTOS) 45 MG tablet, Take 1 tablet by mouth Daily., Disp: 90 tablet, Rfl: 2  •  simvastatin (ZOCOR) 40 MG tablet, TAKE 1 TABLET BY MOUTH EVERY NIGHT., Disp: 90 tablet, Rfl: 3  •  vitamin C (ASCORBIC ACID) 500 MG tablet, Take 2 tablets by mouth Daily., Disp: , Rfl:     Past Medical History:   Diagnosis Date   • Acute bronchitis    • Arthritis    • BPH (benign prostatic hyperplasia)    • Carpal tunnel syndrome    • Colon cancer screening    • Diabetes mellitus (HCC)    • Hyperlipidemia    • Hypertension    • Immunization deficiency    • Urinary urgency        Past Surgical History:   Procedure Laterality Date   • COLONOSCOPY  2019    normal   • HAND SURGERY Right     Right Thumb surgery        Family History   Problem Relation Age of Onset   • Diabetes Mother    • Hypertension Mother        Social History     Tobacco Use   • Smoking status: Never   • Smokeless tobacco: Never   Substance Use Topics   • Alcohol use: Yes            Objective     Vitals:    03/13/23 1455   BP: 118/70   Pulse: 69   Temp: 97.6 °F (36.4 °C)   SpO2: 96%     Body mass index is 39.54 kg/m².    Physical Exam  Vitals reviewed.   Constitutional:       Appearance: He is well-developed. He is not diaphoretic.   HENT:      Head: Normocephalic and atraumatic.   Eyes:      General: No scleral icterus.     Pupils: Pupils are equal, round, and reactive to light.   Neck:      Thyroid: No thyromegaly.   Cardiovascular:       Rate and Rhythm: Normal rate and regular rhythm.      Heart sounds: No murmur heard.    No friction rub. No gallop.   Pulmonary:      Effort: Pulmonary effort is normal. No respiratory distress.      Breath sounds: No wheezing or rales.   Chest:      Chest wall: No tenderness.   Abdominal:      General: Bowel sounds are normal. There is no distension.      Palpations: Abdomen is soft.      Tenderness: There is no abdominal tenderness.   Musculoskeletal:         General: No deformity. Normal range of motion.   Lymphadenopathy:      Cervical: No cervical adenopathy.   Skin:     General: Skin is warm and dry.      Findings: No rash.   Neurological:      Cranial Nerves: No cranial nerve deficit.      Motor: No abnormal muscle tone.         Lab Results   Component Value Date    GLUCOSE 120 (H) 11/21/2022    BUN 16 11/21/2022    CREATININE 0.84 11/21/2022    EGFRIFNONA 101 02/17/2022    EGFRIFAFRI 116 02/17/2022    BCR 19.0 11/21/2022    K 4.9 11/21/2022    CO2 31.0 (H) 11/21/2022    CALCIUM 9.9 11/21/2022    PROTENTOTREF 6.9 11/21/2022    ALBUMIN 4.70 11/21/2022    LABIL2 2.1 11/21/2022    AST 16 11/21/2022    ALT 29 11/21/2022       WBC   Date Value Ref Range Status   09/17/2019 7.0 3.4 - 10.8 x10E3/uL Final   07/13/2019 18.99 (H) 4.5 - 11.0 10*3/uL Final     RBC   Date Value Ref Range Status   09/17/2019 5.09 4.14 - 5.80 x10E6/uL Final   07/13/2019 4.89 4.5 - 5.9 10*6/uL Final     Hemoglobin   Date Value Ref Range Status   09/17/2019 14.8 13.0 - 17.7 g/dL Final   07/13/2019 14.0 13.5 - 17.5 g/dL Final     Hematocrit   Date Value Ref Range Status   09/17/2019 43.1 37.5 - 51.0 % Final   07/13/2019 44.0 41.0 - 53.0 % Final     MCV   Date Value Ref Range Status   09/17/2019 85 79 - 97 fL Final   07/13/2019 90.0 80.0 - 100.0 fL Final     MCH   Date Value Ref Range Status   09/17/2019 29.1 26.6 - 33.0 pg Final   07/13/2019 28.6 26.0 - 34.0 pg Final     MCHC   Date Value Ref Range Status   09/17/2019 34.3 31.5 - 35.7 g/dL  Final   07/13/2019 31.8 31.0 - 37.0 g/dL Final     RDW   Date Value Ref Range Status   09/17/2019 13.8 12.3 - 15.4 % Final   07/13/2019 14.0 12.0 - 16.8 % Final     MPV   Date Value Ref Range Status   07/13/2019 11.0 (H) 6.7 - 10.8 fL Final     Platelets   Date Value Ref Range Status   09/17/2019 274 150 - 450 x10E3/uL Final   07/13/2019 219 140 - 440 10*3/uL Final     Neutrophil Rel %   Date Value Ref Range Status   09/17/2019 59 Not Estab. % Final   07/13/2019 84.8 (H) 45 - 80 % Final     Lymphocyte Rel %   Date Value Ref Range Status   09/17/2019 27 Not Estab. % Final   07/13/2019 6.5 (L) 15 - 50 % Final     Monocyte Rel %   Date Value Ref Range Status   09/17/2019 10 Not Estab. % Final   07/13/2019 7.9 0 - 15 % Final     Eosinophil Rel %   Date Value Ref Range Status   09/17/2019 4 Not Estab. % Final     Eosinophil %   Date Value Ref Range Status   07/13/2019 0.3 0 - 7 % Final     Basophil Rel %   Date Value Ref Range Status   09/17/2019 0 Not Estab. % Final   07/13/2019 0.1 0 - 2 % Final     Immature Grans %   Date Value Ref Range Status   07/13/2019 0.4 (H) 0 % Final     Neutrophils Absolute   Date Value Ref Range Status   09/17/2019 4.1 1.4 - 7.0 x10E3/uL Final   07/13/2019 16.10 (H) 2.0 - 8.8 10*3/uL Final     Lymphocytes Absolute   Date Value Ref Range Status   09/17/2019 1.9 0.7 - 3.1 x10E3/uL Final   07/13/2019 1.24 0.7 - 5.5 10*3/uL Final     Monocytes Absolute   Date Value Ref Range Status   09/17/2019 0.7 0.1 - 0.9 x10E3/uL Final   07/13/2019 1.50 0.0 - 1.7 10*3/uL Final     Eosinophils Absolute   Date Value Ref Range Status   09/17/2019 0.3 0.0 - 0.4 x10E3/uL Final   07/13/2019 0.06 0.0 - 0.8 10*3/uL Final     Basophils Absolute   Date Value Ref Range Status   09/17/2019 0.0 0.0 - 0.2 x10E3/uL Final   07/13/2019 0.02 0.0 - 0.2 10*3/uL Final     Immature Grans, Absolute   Date Value Ref Range Status   07/13/2019 0.07 <1 10*3/uL Final     nRBC   Date Value Ref Range Status   07/13/2019 0 0 /100(WBC)  Final       Lab Results   Component Value Date    HGBA1C 7.40 (H) 11/21/2022       Lab Results   Component Value Date    [id number] 510 10/13/2021       TSH   Date Value Ref Range Status   09/17/2019 1.470 0.450 - 4.500 uIU/mL Final       No results found for: CHOL  Lab Results   Component Value Date    TRIG 120 06/16/2022     Lab Results   Component Value Date    HDL 61 06/16/2022     Lab Results   Component Value Date     (H) 06/16/2022     Lab Results   Component Value Date    VLDL 21 06/16/2022     No results found for: LDLHDL      Procedures    Assessment & Plan   Problems Addressed this Visit     BPH (benign prostatic hyperplasia)    Relevant Orders    PSA DIAGNOSTIC    Diabetes mellitus (HCC)    Relevant Orders    Comprehensive Metabolic Panel    Hemoglobin A1c    Microalbumin / Creatinine Urine Ratio - Urine, Clean Catch    Encounter for annual health examination - Primary    Hyperlipidemia    Relevant Orders    Comprehensive Metabolic Panel    Lipid Panel With / Chol / HDL Ratio    Hypertension    Relevant Orders    Comprehensive Metabolic Panel   Diagnoses       Codes Comments    Encounter for annual health examination    -  Primary ICD-10-CM: Z00.00  ICD-9-CM: V70.0     Type 2 diabetes mellitus without complication, without long-term current use of insulin (HCC)     ICD-10-CM: E11.9  ICD-9-CM: 250.00     Primary hypertension     ICD-10-CM: I10  ICD-9-CM: 401.9     Mixed hyperlipidemia     ICD-10-CM: E78.2  ICD-9-CM: 272.2     Benign prostatic hyperplasia with urinary frequency     ICD-10-CM: N40.1, R35.0  ICD-9-CM: 600.01, 788.41       DM2.  UNCONTROLLED.  cOUNSELED ON WT LOSS.  Check A1c, CMP. Continue wenceslao 45 a day.    Preventive Counseling:  Encouraged to stay active.  Covid vaccine booster declined.    HEP A UTD.  Pneumovax UTD.  Colonoscopy UTD.    Dentist UTD.  Optho UTD.      Orders Placed This Encounter   Procedures   • Comprehensive Metabolic Panel     Order Specific Question:   Release to  patient     Answer:   Routine Release   • PSA DIAGNOSTIC     Order Specific Question:   Release to patient     Answer:   Routine Release   • Lipid Panel With / Chol / HDL Ratio     Order Specific Question:   Release to patient     Answer:   Routine Release   • Hemoglobin A1c     Order Specific Question:   Release to patient     Answer:   Routine Release   • Microalbumin / Creatinine Urine Ratio - Urine, Clean Catch     Order Specific Question:   Release to patient     Answer:   Routine Release       Current Outpatient Medications   Medication Sig Dispense Refill   • Cetirizine HCl 10 MG capsule Take  by mouth.     • ipratropium (ATROVENT) 0.03 % nasal spray 2 sprays into the nostril(s) as directed by provider 3 (Three) Times a Day. 30 mL 12   • lisinopril (PRINIVIL,ZESTRIL) 20 MG tablet TAKE 1 TABLET EVERY DAY 90 tablet 3   • meloxicam (MOBIC) 15 MG tablet Take 1 tablet by mouth Daily. 90 tablet 2   • pioglitazone (ACTOS) 45 MG tablet Take 1 tablet by mouth Daily. 90 tablet 2   • simvastatin (ZOCOR) 40 MG tablet TAKE 1 TABLET BY MOUTH EVERY NIGHT. 90 tablet 3   • vitamin C (ASCORBIC ACID) 500 MG tablet Take 2 tablets by mouth Daily.       No current facility-administered medications for this visit.       Return in about 4 months (around 7/13/2023).    There are no Patient Instructions on file for this visit.

## 2023-03-14 LAB
ALBUMIN SERPL-MCNC: 4.8 G/DL (ref 3.5–5.2)
ALBUMIN/CREAT UR: 8 MG/G CREAT (ref 0–29)
ALBUMIN/GLOB SERPL: 2.2 G/DL
ALP SERPL-CCNC: 85 U/L (ref 39–117)
ALT SERPL-CCNC: 39 U/L (ref 1–41)
AST SERPL-CCNC: 19 U/L (ref 1–40)
BILIRUB SERPL-MCNC: 0.3 MG/DL (ref 0–1.2)
BUN SERPL-MCNC: 16 MG/DL (ref 6–20)
BUN/CREAT SERPL: 21.3 (ref 7–25)
CALCIUM SERPL-MCNC: 9.9 MG/DL (ref 8.6–10.5)
CHLORIDE SERPL-SCNC: 101 MMOL/L (ref 98–107)
CHOLEST SERPL-MCNC: 231 MG/DL (ref 0–200)
CHOLEST/HDLC SERPL: 3.61 {RATIO}
CO2 SERPL-SCNC: 23.9 MMOL/L (ref 22–29)
CREAT SERPL-MCNC: 0.75 MG/DL (ref 0.76–1.27)
CREAT UR-MCNC: 70.9 MG/DL
EGFRCR SERPLBLD CKD-EPI 2021: 105.9 ML/MIN/1.73
GLOBULIN SER CALC-MCNC: 2.2 GM/DL
GLUCOSE SERPL-MCNC: 129 MG/DL (ref 65–99)
HBA1C MFR BLD: 7.7 % (ref 4.8–5.6)
HDLC SERPL-MCNC: 64 MG/DL (ref 40–60)
LDLC SERPL CALC-MCNC: 138 MG/DL (ref 0–100)
MICROALBUMIN UR-MCNC: 5.4 UG/ML
POTASSIUM SERPL-SCNC: 4.5 MMOL/L (ref 3.5–5.2)
PROT SERPL-MCNC: 7 G/DL (ref 6–8.5)
PSA SERPL-MCNC: 0.52 NG/ML (ref 0–4)
SODIUM SERPL-SCNC: 141 MMOL/L (ref 136–145)
TRIGL SERPL-MCNC: 163 MG/DL (ref 0–150)
VLDLC SERPL CALC-MCNC: 29 MG/DL (ref 5–40)

## 2023-06-16 RX ORDER — LISINOPRIL 20 MG/1
TABLET ORAL
Qty: 90 TABLET | Refills: 3 | Status: SHIPPED | OUTPATIENT
Start: 2023-06-16

## 2023-08-21 ENCOUNTER — OFFICE VISIT (OUTPATIENT)
Dept: FAMILY MEDICINE CLINIC | Facility: CLINIC | Age: 58
End: 2023-08-21
Payer: COMMERCIAL

## 2023-08-21 VITALS
HEART RATE: 63 BPM | DIASTOLIC BLOOD PRESSURE: 82 MMHG | BODY MASS INDEX: 37.89 KG/M2 | OXYGEN SATURATION: 95 % | WEIGHT: 250 LBS | SYSTOLIC BLOOD PRESSURE: 116 MMHG | TEMPERATURE: 98.6 F | HEIGHT: 68 IN

## 2023-08-21 DIAGNOSIS — E78.2 MIXED HYPERLIPIDEMIA: ICD-10-CM

## 2023-08-21 DIAGNOSIS — I10 PRIMARY HYPERTENSION: ICD-10-CM

## 2023-08-21 DIAGNOSIS — E11.9 TYPE 2 DIABETES MELLITUS WITHOUT COMPLICATION, WITHOUT LONG-TERM CURRENT USE OF INSULIN: Primary | ICD-10-CM

## 2023-08-21 DIAGNOSIS — Z28.39 IMMUNIZATION DEFICIENCY: ICD-10-CM

## 2023-08-21 PROCEDURE — 99214 OFFICE O/P EST MOD 30 MIN: CPT | Performed by: FAMILY MEDICINE

## 2023-08-21 PROCEDURE — 90715 TDAP VACCINE 7 YRS/> IM: CPT | Performed by: FAMILY MEDICINE

## 2023-08-21 PROCEDURE — 90471 IMMUNIZATION ADMIN: CPT | Performed by: FAMILY MEDICINE

## 2023-08-21 RX ORDER — SIMVASTATIN 40 MG
40 TABLET ORAL NIGHTLY
Qty: 90 TABLET | Refills: 3 | Status: SHIPPED | OUTPATIENT
Start: 2023-08-21

## 2023-08-21 RX ORDER — PIOGLITAZONEHYDROCHLORIDE 45 MG/1
45 TABLET ORAL DAILY
Qty: 90 TABLET | Refills: 2 | Status: SHIPPED | OUTPATIENT
Start: 2023-08-21

## 2023-08-21 RX ORDER — TOBRAMYCIN AND DEXAMETHASONE 3; 1 MG/ML; MG/ML
SUSPENSION/ DROPS OPHTHALMIC
COMMUNITY
Start: 2023-08-04

## 2023-08-21 NOTE — PROGRESS NOTES
Chief Complaint   Patient presents with    Diabetes       Subjective   Raúl Jarquin is a 58 y.o. male.     Diabetes  Pertinent negatives for hypoglycemia include no confusion. Pertinent negatives for diabetes include no blurred vision, no chest pain, no fatigue, no polyuria and no weakness.    F/U DM2.  Dong wel with 10 lb wt loss.  On wenceslao 45 a day.  Intolerant of metformin.    F/U HTN.  Doing well with meds.    F/U hyperlipidmeia.  No myalgias.   The following portions of the patient's history were reviewed and updated as appropriate: allergies, current medications, past family history, past medical history, past social history, past surgical history and problem list.    Review of Systems   Constitutional:  Negative for appetite change and fatigue.   HENT:  Negative for nosebleeds and sore throat.    Eyes:  Negative for blurred vision and visual disturbance.   Respiratory:  Negative for shortness of breath and wheezing.    Cardiovascular:  Negative for chest pain and leg swelling.   Gastrointestinal:  Negative for abdominal distention and abdominal pain.   Endocrine: Negative for cold intolerance and polyuria.   Genitourinary:  Negative for dysuria and hematuria.   Musculoskeletal:  Negative for arthralgias and myalgias.   Skin:  Negative for color change and rash.   Neurological:  Negative for weakness and confusion.   Psychiatric/Behavioral:  Negative for agitation and depressed mood.      Patient Active Problem List   Diagnosis    Hypertension    Hyperlipidemia    Diabetes mellitus    Carpal tunnel syndrome    BPH (benign prostatic hyperplasia)    Arthritis    Leukemoid reaction    Acute right-sided low back pain    Hematuria    Sore throat    Nephrolithiasis    Encounter for annual health examination    Sebaceous cyst    Encounter for hepatitis C screening test for low risk patient    Seasonal allergic rhinitis due to pollen    Bronchitis       No Known Allergies      Current Outpatient Medications:      Cetirizine HCl 10 MG capsule, Take  by mouth., Disp: , Rfl:     ipratropium (ATROVENT) 0.03 % nasal spray, 2 sprays into the nostril(s) as directed by provider 3 (Three) Times a Day., Disp: 30 mL, Rfl: 12    lisinopril (PRINIVIL,ZESTRIL) 20 MG tablet, TAKE 1 TABLET EVERY DAY, Disp: 90 tablet, Rfl: 3    meloxicam (MOBIC) 15 MG tablet, Take 1 tablet by mouth Daily., Disp: 90 tablet, Rfl: 2    pioglitazone (ACTOS) 45 MG tablet, Take 1 tablet by mouth Daily., Disp: 90 tablet, Rfl: 2    simvastatin (ZOCOR) 40 MG tablet, TAKE 1 TABLET BY MOUTH EVERY NIGHT., Disp: 90 tablet, Rfl: 3    tobramycin-dexAMETHasone (TOBRADEX) 0.3-0.1 % ophthalmic suspension, SHAKE LIQUID AND INSTILL 1 DROP IN RIGHT EYE EVERY 2 HOURS, Disp: , Rfl:     vitamin C (ASCORBIC ACID) 500 MG tablet, Take 2 tablets by mouth Daily., Disp: , Rfl:     Past Medical History:   Diagnosis Date    Acute bronchitis     Arthritis     BPH (benign prostatic hyperplasia)     Carpal tunnel syndrome     Colon cancer screening     Diabetes mellitus     Hyperlipidemia     Hypertension     Immunization deficiency     Urinary urgency        Past Surgical History:   Procedure Laterality Date    COLONOSCOPY  2019    normal    HAND SURGERY Right     Right Thumb surgery        Family History   Problem Relation Age of Onset    Diabetes Mother     Hypertension Mother        Social History     Tobacco Use    Smoking status: Never    Smokeless tobacco: Never   Substance Use Topics    Alcohol use: Yes            Objective     Vitals:    08/21/23 0740   BP: 116/82   Pulse: 63   Temp: 98.6 øF (37 øC)   SpO2: 95%     Body mass index is 38.02 kg/mý.    Physical Exam  Vitals reviewed.   Constitutional:       Appearance: He is well-developed. He is not diaphoretic.   HENT:      Head: Normocephalic and atraumatic.   Eyes:      General: No scleral icterus.     Pupils: Pupils are equal, round, and reactive to light.   Neck:      Thyroid: No thyromegaly.   Cardiovascular:      Rate and  Rhythm: Normal rate and regular rhythm.      Heart sounds: No murmur heard.    No friction rub. No gallop.   Pulmonary:      Effort: Pulmonary effort is normal. No respiratory distress.      Breath sounds: No wheezing or rales.   Chest:      Chest wall: No tenderness.   Abdominal:      General: Bowel sounds are normal. There is no distension.      Palpations: Abdomen is soft.      Tenderness: There is no abdominal tenderness.   Musculoskeletal:         General: No deformity. Normal range of motion.   Lymphadenopathy:      Cervical: No cervical adenopathy.   Skin:     General: Skin is warm and dry.      Findings: No rash.   Neurological:      Cranial Nerves: No cranial nerve deficit.      Motor: No abnormal muscle tone.       Lab Results   Component Value Date    GLUCOSE 129 (H) 03/13/2023    BUN 16 03/13/2023    CREATININE 0.75 (L) 03/13/2023    EGFRIFNONA 101 02/17/2022    EGFRIFAFRI 116 02/17/2022    BCR 21.3 03/13/2023    K 4.5 03/13/2023    CO2 23.9 03/13/2023    CALCIUM 9.9 03/13/2023    PROTENTOTREF 7.0 03/13/2023    ALBUMIN 4.8 03/13/2023    LABIL2 2.2 03/13/2023    AST 19 03/13/2023    ALT 39 03/13/2023       WBC   Date Value Ref Range Status   09/17/2019 7.0 3.4 - 10.8 x10E3/uL Final   07/13/2019 18.99 (H) 4.5 - 11.0 10*3/uL Final     RBC   Date Value Ref Range Status   09/17/2019 5.09 4.14 - 5.80 x10E6/uL Final   07/13/2019 4.89 4.5 - 5.9 10*6/uL Final     Hemoglobin   Date Value Ref Range Status   09/17/2019 14.8 13.0 - 17.7 g/dL Final   07/13/2019 14.0 13.5 - 17.5 g/dL Final     Hematocrit   Date Value Ref Range Status   09/17/2019 43.1 37.5 - 51.0 % Final   07/13/2019 44.0 41.0 - 53.0 % Final     MCV   Date Value Ref Range Status   09/17/2019 85 79 - 97 fL Final   07/13/2019 90.0 80.0 - 100.0 fL Final     MCH   Date Value Ref Range Status   09/17/2019 29.1 26.6 - 33.0 pg Final   07/13/2019 28.6 26.0 - 34.0 pg Final     MCHC   Date Value Ref Range Status   09/17/2019 34.3 31.5 - 35.7 g/dL Final    07/13/2019 31.8 31.0 - 37.0 g/dL Final     RDW   Date Value Ref Range Status   09/17/2019 13.8 12.3 - 15.4 % Final   07/13/2019 14.0 12.0 - 16.8 % Final     MPV   Date Value Ref Range Status   07/13/2019 11.0 (H) 6.7 - 10.8 fL Final     Platelets   Date Value Ref Range Status   09/17/2019 274 150 - 450 x10E3/uL Final   07/13/2019 219 140 - 440 10*3/uL Final     Neutrophil Rel %   Date Value Ref Range Status   09/17/2019 59 Not Estab. % Final   07/13/2019 84.8 (H) 45 - 80 % Final     Lymphocyte Rel %   Date Value Ref Range Status   09/17/2019 27 Not Estab. % Final   07/13/2019 6.5 (L) 15 - 50 % Final     Monocyte Rel %   Date Value Ref Range Status   09/17/2019 10 Not Estab. % Final   07/13/2019 7.9 0 - 15 % Final     Eosinophil Rel %   Date Value Ref Range Status   09/17/2019 4 Not Estab. % Final     Eosinophil %   Date Value Ref Range Status   07/13/2019 0.3 0 - 7 % Final     Basophil Rel %   Date Value Ref Range Status   09/17/2019 0 Not Estab. % Final   07/13/2019 0.1 0 - 2 % Final     Immature Grans %   Date Value Ref Range Status   07/13/2019 0.4 (H) 0 % Final     Neutrophils Absolute   Date Value Ref Range Status   09/17/2019 4.1 1.4 - 7.0 x10E3/uL Final   07/13/2019 16.10 (H) 2.0 - 8.8 10*3/uL Final     Lymphocytes Absolute   Date Value Ref Range Status   09/17/2019 1.9 0.7 - 3.1 x10E3/uL Final   07/13/2019 1.24 0.7 - 5.5 10*3/uL Final     Monocytes Absolute   Date Value Ref Range Status   09/17/2019 0.7 0.1 - 0.9 x10E3/uL Final   07/13/2019 1.50 0.0 - 1.7 10*3/uL Final     Eosinophils Absolute   Date Value Ref Range Status   09/17/2019 0.3 0.0 - 0.4 x10E3/uL Final   07/13/2019 0.06 0.0 - 0.8 10*3/uL Final     Basophils Absolute   Date Value Ref Range Status   09/17/2019 0.0 0.0 - 0.2 x10E3/uL Final   07/13/2019 0.02 0.0 - 0.2 10*3/uL Final     Immature Grans, Absolute   Date Value Ref Range Status   07/13/2019 0.07 <1 10*3/uL Final     nRBC   Date Value Ref Range Status   07/13/2019 0 0 /100(WBC) Final        Lab Results   Component Value Date    HGBA1C 7.70 (H) 03/13/2023       Lab Results   Component Value Date    UJCRNYBY80 510 10/13/2021       TSH   Date Value Ref Range Status   09/17/2019 1.470 0.450 - 4.500 uIU/mL Final       No results found for: CHOL  Lab Results   Component Value Date    TRIG 163 (H) 03/13/2023     Lab Results   Component Value Date    HDL 64 (H) 03/13/2023     Lab Results   Component Value Date     (H) 03/13/2023     Lab Results   Component Value Date    VLDL 29 03/13/2023     No results found for: LDLHDL      Procedures    Assessment & Plan   Problems Addressed this Visit       Diabetes mellitus - Primary    Hyperlipidemia    Hypertension     Diagnoses         Codes Comments    Type 2 diabetes mellitus without complication, without long-term current use of insulin    -  Primary ICD-10-CM: E11.9  ICD-9-CM: 250.00     Primary hypertension     ICD-10-CM: I10  ICD-9-CM: 401.9     Mixed hyperlipidemia     ICD-10-CM: E78.2  ICD-9-CM: 272.2           DM2.  Check A1c, CMP.  Uncontrolled.  RF wenceslao today.    Hyperlipidmeia.  Check FLP.  Uncontrolled.  RF simvastatin.    Htn. Controlled.  Continue lisinopril.   TDAP today.    No orders of the defined types were placed in this encounter.      Current Outpatient Medications   Medication Sig Dispense Refill    Cetirizine HCl 10 MG capsule Take  by mouth.      ipratropium (ATROVENT) 0.03 % nasal spray 2 sprays into the nostril(s) as directed by provider 3 (Three) Times a Day. 30 mL 12    lisinopril (PRINIVIL,ZESTRIL) 20 MG tablet TAKE 1 TABLET EVERY DAY 90 tablet 3    meloxicam (MOBIC) 15 MG tablet Take 1 tablet by mouth Daily. 90 tablet 2    pioglitazone (ACTOS) 45 MG tablet Take 1 tablet by mouth Daily. 90 tablet 2    simvastatin (ZOCOR) 40 MG tablet TAKE 1 TABLET BY MOUTH EVERY NIGHT. 90 tablet 3    tobramycin-dexAMETHasone (TOBRADEX) 0.3-0.1 % ophthalmic suspension SHAKE LIQUID AND INSTILL 1 DROP IN RIGHT EYE EVERY 2 HOURS      vitamin C  (ASCORBIC ACID) 500 MG tablet Take 2 tablets by mouth Daily.       No current facility-administered medications for this visit.       Rubéncollin Britton had no medications administered during this visit.    No follow-ups on file.    There are no Patient Instructions on file for this visit.

## 2023-08-22 DIAGNOSIS — E11.9 TYPE 2 DIABETES MELLITUS WITHOUT COMPLICATION, WITHOUT LONG-TERM CURRENT USE OF INSULIN: ICD-10-CM

## 2023-08-22 DIAGNOSIS — E11.9 TYPE 2 DIABETES MELLITUS WITHOUT COMPLICATION, WITHOUT LONG-TERM CURRENT USE OF INSULIN: Primary | ICD-10-CM

## 2023-08-22 LAB
ALBUMIN SERPL-MCNC: 4.4 G/DL (ref 3.5–5.2)
ALBUMIN/GLOB SERPL: 2 G/DL
ALP SERPL-CCNC: 75 U/L (ref 39–117)
ALT SERPL-CCNC: 33 U/L (ref 1–41)
AST SERPL-CCNC: 15 U/L (ref 1–40)
BILIRUB SERPL-MCNC: 0.3 MG/DL (ref 0–1.2)
BUN SERPL-MCNC: 23 MG/DL (ref 6–20)
BUN/CREAT SERPL: 25.8 (ref 7–25)
CALCIUM SERPL-MCNC: 9.8 MG/DL (ref 8.6–10.5)
CHLORIDE SERPL-SCNC: 104 MMOL/L (ref 98–107)
CHOLEST SERPL-MCNC: 212 MG/DL (ref 0–200)
CHOLEST/HDLC SERPL: 3.72 {RATIO}
CO2 SERPL-SCNC: 28.1 MMOL/L (ref 22–29)
CREAT SERPL-MCNC: 0.89 MG/DL (ref 0.76–1.27)
EGFRCR SERPLBLD CKD-EPI 2021: 100.6 ML/MIN/1.73
GLOBULIN SER CALC-MCNC: 2.2 GM/DL
GLUCOSE SERPL-MCNC: 156 MG/DL (ref 65–99)
HBA1C MFR BLD: 8.5 % (ref 4.8–5.6)
HDLC SERPL-MCNC: 57 MG/DL (ref 40–60)
LDLC SERPL CALC-MCNC: 122 MG/DL (ref 0–100)
POTASSIUM SERPL-SCNC: 5.3 MMOL/L (ref 3.5–5.2)
PROT SERPL-MCNC: 6.6 G/DL (ref 6–8.5)
SODIUM SERPL-SCNC: 142 MMOL/L (ref 136–145)
TRIGL SERPL-MCNC: 190 MG/DL (ref 0–150)
VLDLC SERPL CALC-MCNC: 33 MG/DL (ref 5–40)

## 2023-08-22 RX ORDER — DAPAGLIFLOZIN 5 MG/1
5 TABLET, FILM COATED ORAL DAILY
Qty: 90 TABLET | Refills: 3 | Status: SHIPPED | OUTPATIENT
Start: 2023-08-22 | End: 2023-08-22 | Stop reason: SDUPTHER

## 2023-08-22 RX ORDER — DAPAGLIFLOZIN 5 MG/1
5 TABLET, FILM COATED ORAL DAILY
Qty: 90 TABLET | Refills: 3 | Status: SHIPPED | OUTPATIENT
Start: 2023-08-22

## 2023-10-05 DIAGNOSIS — M19.90 ARTHRITIS: ICD-10-CM

## 2023-10-06 RX ORDER — MELOXICAM 15 MG/1
TABLET ORAL
Qty: 90 TABLET | Refills: 0 | Status: SHIPPED | OUTPATIENT
Start: 2023-10-06

## 2023-12-06 DIAGNOSIS — E11.9 TYPE 2 DIABETES MELLITUS WITHOUT COMPLICATION, WITHOUT LONG-TERM CURRENT USE OF INSULIN: ICD-10-CM

## 2023-12-06 RX ORDER — DAPAGLIFLOZIN 5 MG/1
5 TABLET, FILM COATED ORAL DAILY
Qty: 90 TABLET | Refills: 3 | Status: SHIPPED | OUTPATIENT
Start: 2023-12-06

## 2023-12-06 NOTE — TELEPHONE ENCOUNTER
Caller: Raúl Jarquin    Relationship: Self    Best call back number: [phone]    Requested Prescriptions:   Requested Prescriptions     Pending Prescriptions Disp Refills    dapagliflozin (Farxiga) 5 MG tablet tablet 90 tablet 3     Sig: Take 1 tablet by mouth Daily.        Pharmacy where request should be sent: [facility] [address] [phone] [facility] [phone]      Last office visit with prescribing clinician: 8/21/2023   Last telemedicine visit with prescribing clinician: Visit date not found   Next office visit with prescribing clinician: 12/21/2023       Does the patient have less than a 3 day supply:  [x] Yes  [] No    Would you like a call back once the refill request has been completed: [] Yes [x] No    If the office needs to give you a call back, can they leave a voicemail: [] Yes [x] No    Aruna Rodriguez Rep   12/06/23 09:59 EST

## 2023-12-21 ENCOUNTER — OFFICE VISIT (OUTPATIENT)
Dept: FAMILY MEDICINE CLINIC | Facility: CLINIC | Age: 58
End: 2023-12-21
Payer: COMMERCIAL

## 2023-12-21 VITALS
OXYGEN SATURATION: 96 % | HEART RATE: 57 BPM | TEMPERATURE: 98 F | BODY MASS INDEX: 35.59 KG/M2 | DIASTOLIC BLOOD PRESSURE: 80 MMHG | WEIGHT: 234 LBS | SYSTOLIC BLOOD PRESSURE: 130 MMHG

## 2023-12-21 DIAGNOSIS — E11.65 TYPE 2 DIABETES MELLITUS WITH HYPERGLYCEMIA, WITHOUT LONG-TERM CURRENT USE OF INSULIN: ICD-10-CM

## 2023-12-21 DIAGNOSIS — E78.2 MIXED HYPERLIPIDEMIA: Primary | ICD-10-CM

## 2023-12-21 DIAGNOSIS — M19.90 ARTHRITIS: ICD-10-CM

## 2023-12-21 RX ORDER — DAPAGLIFLOZIN 5 MG/1
5 TABLET, FILM COATED ORAL DAILY
Qty: 90 TABLET | Refills: 3 | Status: SHIPPED | OUTPATIENT
Start: 2023-12-21

## 2023-12-21 RX ORDER — MELOXICAM 15 MG/1
15 TABLET ORAL DAILY
Qty: 90 TABLET | Refills: 1 | Status: SHIPPED | OUTPATIENT
Start: 2023-12-21

## 2023-12-21 RX ORDER — SIMVASTATIN 40 MG
40 TABLET ORAL NIGHTLY
Qty: 90 TABLET | Refills: 3 | Status: SHIPPED | OUTPATIENT
Start: 2023-12-21

## 2023-12-21 RX ORDER — LISINOPRIL 20 MG/1
20 TABLET ORAL DAILY
Qty: 90 TABLET | Refills: 3 | Status: SHIPPED | OUTPATIENT
Start: 2023-12-21

## 2023-12-21 RX ORDER — PIOGLITAZONEHYDROCHLORIDE 45 MG/1
45 TABLET ORAL DAILY
Qty: 90 TABLET | Refills: 3 | Status: SHIPPED | OUTPATIENT
Start: 2023-12-21

## 2023-12-22 LAB
ALBUMIN SERPL-MCNC: 4.6 G/DL (ref 3.5–5.2)
ALBUMIN/GLOB SERPL: 1.8 G/DL
ALP SERPL-CCNC: 88 U/L (ref 39–117)
ALT SERPL-CCNC: 23 U/L (ref 1–41)
AST SERPL-CCNC: 15 U/L (ref 1–40)
BILIRUB SERPL-MCNC: 0.4 MG/DL (ref 0–1.2)
BUN SERPL-MCNC: 20 MG/DL (ref 6–20)
BUN/CREAT SERPL: 21.5 (ref 7–25)
CALCIUM SERPL-MCNC: 9.9 MG/DL (ref 8.6–10.5)
CHLORIDE SERPL-SCNC: 103 MMOL/L (ref 98–107)
CHOLEST SERPL-MCNC: 173 MG/DL (ref 0–200)
CHOLEST/HDLC SERPL: 2.79 {RATIO}
CO2 SERPL-SCNC: 26.6 MMOL/L (ref 22–29)
CREAT SERPL-MCNC: 0.93 MG/DL (ref 0.76–1.27)
EGFRCR SERPLBLD CKD-EPI 2021: 96.4 ML/MIN/1.73
GLOBULIN SER CALC-MCNC: 2.6 GM/DL
GLUCOSE SERPL-MCNC: 112 MG/DL (ref 65–99)
HBA1C MFR BLD: 6.8 % (ref 4.8–5.6)
HDLC SERPL-MCNC: 62 MG/DL (ref 40–60)
LDLC SERPL CALC-MCNC: 99 MG/DL (ref 0–100)
POTASSIUM SERPL-SCNC: 4.7 MMOL/L (ref 3.5–5.2)
PROT SERPL-MCNC: 7.2 G/DL (ref 6–8.5)
SODIUM SERPL-SCNC: 139 MMOL/L (ref 136–145)
TRIGL SERPL-MCNC: 63 MG/DL (ref 0–150)
VLDLC SERPL CALC-MCNC: 12 MG/DL (ref 5–40)

## 2024-03-26 ENCOUNTER — OFFICE VISIT (OUTPATIENT)
Dept: FAMILY MEDICINE CLINIC | Facility: CLINIC | Age: 59
End: 2024-03-26
Payer: COMMERCIAL

## 2024-03-26 VITALS
DIASTOLIC BLOOD PRESSURE: 70 MMHG | HEIGHT: 68 IN | SYSTOLIC BLOOD PRESSURE: 122 MMHG | RESPIRATION RATE: 18 BRPM | BODY MASS INDEX: 35.16 KG/M2 | HEART RATE: 70 BPM | WEIGHT: 232 LBS | OXYGEN SATURATION: 96 % | TEMPERATURE: 97.4 F

## 2024-03-26 DIAGNOSIS — I10 PRIMARY HYPERTENSION: ICD-10-CM

## 2024-03-26 DIAGNOSIS — E78.2 MIXED HYPERLIPIDEMIA: ICD-10-CM

## 2024-03-26 DIAGNOSIS — E11.65 TYPE 2 DIABETES MELLITUS WITH HYPERGLYCEMIA, WITHOUT LONG-TERM CURRENT USE OF INSULIN: ICD-10-CM

## 2024-03-26 DIAGNOSIS — R35.0 BENIGN PROSTATIC HYPERPLASIA WITH URINARY FREQUENCY: ICD-10-CM

## 2024-03-26 DIAGNOSIS — Z00.00 ENCOUNTER FOR ANNUAL HEALTH EXAMINATION: Primary | ICD-10-CM

## 2024-03-26 DIAGNOSIS — N40.1 BENIGN PROSTATIC HYPERPLASIA WITH URINARY FREQUENCY: ICD-10-CM

## 2024-03-26 PROCEDURE — 99396 PREV VISIT EST AGE 40-64: CPT | Performed by: FAMILY MEDICINE

## 2024-03-26 NOTE — PROGRESS NOTES
Patient here for annual physical exam    Subjective   Raúl Jarquin is a 59 y.o. male.     Diabetes  Pertinent negatives for hypoglycemia include no confusion. Pertinent negatives for diabetes include no blurred vision, no chest pain, no fatigue, no polyuria and no weakness.   Hyperlipidemia  Pertinent negatives include no chest pain, myalgias or shortness of breath.      58 yo WM here for annual.      The following portions of the patient's history were reviewed and updated as appropriate: allergies, current medications, past family history, past medical history, past social history, past surgical history and problem list    Last colonoscopy:2019  Optometry:utd  Dentist: utd  Last PSA(if applicable):needed  Last mammo(if applicable):na    Immunization History   Administered Date(s) Administered    COVID-19 (PFIZER) Purple Cap Monovalent 08/28/2021, 09/24/2021    Hepatitis A 05/18/2018, 12/06/2018, 07/17/2019    Pneumococcal Polysaccharide (PPSV23) 11/14/2009    Tdap 08/21/2023       Review of Systems   Constitutional:  Negative for appetite change and fatigue.   HENT:  Negative for nosebleeds and sore throat.    Eyes:  Negative for blurred vision and visual disturbance.   Respiratory:  Negative for shortness of breath and wheezing.    Cardiovascular:  Negative for chest pain and leg swelling.   Gastrointestinal:  Negative for abdominal distention and abdominal pain.   Endocrine: Negative for cold intolerance and polyuria.   Genitourinary:  Negative for dysuria and hematuria.   Musculoskeletal:  Negative for arthralgias and myalgias.   Skin:  Negative for color change and rash.   Neurological:  Negative for weakness and confusion.   Psychiatric/Behavioral:  Negative for agitation and depressed mood.        Patient Active Problem List   Diagnosis    Hypertension    Hyperlipidemia    Type 2 diabetes mellitus with hyperglycemia, without long-term current use of insulin    Carpal tunnel syndrome    BPH (benign  prostatic hyperplasia)    Arthritis    Leukemoid reaction    Acute right-sided low back pain    Hematuria    Sore throat    Nephrolithiasis    Encounter for annual health examination    Sebaceous cyst    Encounter for hepatitis C screening test for low risk patient    Seasonal allergic rhinitis due to pollen    Bronchitis    Benign prostatic hyperplasia with urinary frequency       No Known Allergies      Current Outpatient Medications:     Cetirizine HCl 10 MG capsule, Take  by mouth., Disp: , Rfl:     dapagliflozin (Farxiga) 5 MG tablet tablet, Take 1 tablet by mouth Daily., Disp: 90 tablet, Rfl: 3    ipratropium (ATROVENT) 0.03 % nasal spray, 2 sprays into the nostril(s) as directed by provider 3 (Three) Times a Day., Disp: 30 mL, Rfl: 12    lisinopril (PRINIVIL,ZESTRIL) 20 MG tablet, Take 1 tablet by mouth Daily., Disp: 90 tablet, Rfl: 3    meloxicam (MOBIC) 15 MG tablet, Take 1 tablet by mouth Daily., Disp: 90 tablet, Rfl: 1    pioglitazone (ACTOS) 45 MG tablet, Take 1 tablet by mouth Daily., Disp: 90 tablet, Rfl: 3    simvastatin (ZOCOR) 40 MG tablet, Take 1 tablet by mouth Every Night., Disp: 90 tablet, Rfl: 3    tobramycin-dexAMETHasone (TOBRADEX) 0.3-0.1 % ophthalmic suspension, SHAKE LIQUID AND INSTILL 1 DROP IN RIGHT EYE EVERY 2 HOURS, Disp: , Rfl:     vitamin C (ASCORBIC ACID) 500 MG tablet, Take 2 tablets by mouth Daily., Disp: , Rfl:     Past Medical History:   Diagnosis Date    Acute bronchitis     Arthritis     BPH (benign prostatic hyperplasia)     Carpal tunnel syndrome     Colon cancer screening     Diabetes mellitus     Hyperlipidemia     Hypertension     Immunization deficiency     Urinary urgency        Past Surgical History:   Procedure Laterality Date    COLONOSCOPY  2019    normal    HAND SURGERY Right     Right Thumb surgery        Family History   Problem Relation Age of Onset    Diabetes Mother     Hypertension Mother        Social History     Tobacco Use    Smoking status: Never     Smokeless tobacco: Never   Substance Use Topics    Alcohol use: Yes            Objective     Vitals:    03/26/24 1414   BP: 122/70   Pulse: 70   Resp: 18   Temp: 97.4 °F (36.3 °C)   SpO2: 96%     Body mass index is 35.28 kg/m².    Physical Exam  Vitals reviewed.   Constitutional:       Appearance: He is well-developed. He is not diaphoretic.   HENT:      Head: Normocephalic and atraumatic.   Eyes:      General: No scleral icterus.     Pupils: Pupils are equal, round, and reactive to light.   Neck:      Thyroid: No thyromegaly.   Cardiovascular:      Rate and Rhythm: Normal rate and regular rhythm.      Heart sounds: No murmur heard.     No friction rub. No gallop.   Pulmonary:      Effort: Pulmonary effort is normal. No respiratory distress.      Breath sounds: No wheezing or rales.   Chest:      Chest wall: No tenderness.   Abdominal:      General: Bowel sounds are normal. There is no distension.      Palpations: Abdomen is soft.      Tenderness: There is no abdominal tenderness.   Musculoskeletal:         General: No deformity. Normal range of motion.   Lymphadenopathy:      Cervical: No cervical adenopathy.   Skin:     General: Skin is warm and dry.      Findings: No rash.   Neurological:      Cranial Nerves: No cranial nerve deficit.      Motor: No abnormal muscle tone.         Lab Results   Component Value Date    GLUCOSE 112 (H) 12/21/2023    BUN 20 12/21/2023    CREATININE 0.93 12/21/2023    EGFRIFNONA 101 02/17/2022    EGFRIFAFRI 116 02/17/2022    BCR 21.5 12/21/2023    K 4.7 12/21/2023    CO2 26.6 12/21/2023    CALCIUM 9.9 12/21/2023    PROTENTOTREF 7.2 12/21/2023    ALBUMIN 4.6 12/21/2023    LABIL2 1.8 12/21/2023    AST 15 12/21/2023    ALT 23 12/21/2023       WBC   Date Value Ref Range Status   09/17/2019 7.0 3.4 - 10.8 x10E3/uL Final   07/13/2019 18.99 (H) 4.5 - 11.0 10*3/uL Final     RBC   Date Value Ref Range Status   09/17/2019 5.09 4.14 - 5.80 x10E6/uL Final   07/13/2019 4.89 4.5 - 5.9 10*6/uL Final      Hemoglobin   Date Value Ref Range Status   09/17/2019 14.8 13.0 - 17.7 g/dL Final   07/13/2019 14.0 13.5 - 17.5 g/dL Final     Hematocrit   Date Value Ref Range Status   09/17/2019 43.1 37.5 - 51.0 % Final   07/13/2019 44.0 41.0 - 53.0 % Final     MCV   Date Value Ref Range Status   09/17/2019 85 79 - 97 fL Final   07/13/2019 90.0 80.0 - 100.0 fL Final     MCH   Date Value Ref Range Status   09/17/2019 29.1 26.6 - 33.0 pg Final   07/13/2019 28.6 26.0 - 34.0 pg Final     MCHC   Date Value Ref Range Status   09/17/2019 34.3 31.5 - 35.7 g/dL Final   07/13/2019 31.8 31.0 - 37.0 g/dL Final     RDW   Date Value Ref Range Status   09/17/2019 13.8 12.3 - 15.4 % Final   07/13/2019 14.0 12.0 - 16.8 % Final     MPV   Date Value Ref Range Status   07/13/2019 11.0 (H) 6.7 - 10.8 fL Final     Platelets   Date Value Ref Range Status   09/17/2019 274 150 - 450 x10E3/uL Final   07/13/2019 219 140 - 440 10*3/uL Final     Neutrophil Rel %   Date Value Ref Range Status   09/17/2019 59 Not Estab. % Final   07/13/2019 84.8 (H) 45 - 80 % Final     Lymphocyte Rel %   Date Value Ref Range Status   09/17/2019 27 Not Estab. % Final   07/13/2019 6.5 (L) 15 - 50 % Final     Monocyte Rel %   Date Value Ref Range Status   09/17/2019 10 Not Estab. % Final   07/13/2019 7.9 0 - 15 % Final     Eosinophil Rel %   Date Value Ref Range Status   09/17/2019 4 Not Estab. % Final     Eosinophil %   Date Value Ref Range Status   07/13/2019 0.3 0 - 7 % Final     Basophil Rel %   Date Value Ref Range Status   09/17/2019 0 Not Estab. % Final   07/13/2019 0.1 0 - 2 % Final     Immature Grans %   Date Value Ref Range Status   07/13/2019 0.4 (H) 0 % Final     Neutrophils Absolute   Date Value Ref Range Status   09/17/2019 4.1 1.4 - 7.0 x10E3/uL Final   07/13/2019 16.10 (H) 2.0 - 8.8 10*3/uL Final     Lymphocytes Absolute   Date Value Ref Range Status   09/17/2019 1.9 0.7 - 3.1 x10E3/uL Final   07/13/2019 1.24 0.7 - 5.5 10*3/uL Final     Monocytes Absolute  "  Date Value Ref Range Status   09/17/2019 0.7 0.1 - 0.9 x10E3/uL Final   07/13/2019 1.50 0.0 - 1.7 10*3/uL Final     Eosinophils Absolute   Date Value Ref Range Status   09/17/2019 0.3 0.0 - 0.4 x10E3/uL Final   07/13/2019 0.06 0.0 - 0.8 10*3/uL Final     Basophils Absolute   Date Value Ref Range Status   09/17/2019 0.0 0.0 - 0.2 x10E3/uL Final   07/13/2019 0.02 0.0 - 0.2 10*3/uL Final     Immature Grans, Absolute   Date Value Ref Range Status   07/13/2019 0.07 <1 10*3/uL Final     nRBC   Date Value Ref Range Status   07/13/2019 0 0 /100(WBC) Final       Lab Results   Component Value Date    HGBA1C 6.80 (H) 12/21/2023       Lab Results   Component Value Date    YZMENDGL21 510 10/13/2021       TSH   Date Value Ref Range Status   09/17/2019 1.470 0.450 - 4.500 uIU/mL Final       No results found for: \"CHOL\"  Lab Results   Component Value Date    TRIG 63 12/21/2023     Lab Results   Component Value Date    HDL 62 (H) 12/21/2023     Lab Results   Component Value Date    LDL 99 12/21/2023     Lab Results   Component Value Date    VLDL 12 12/21/2023     No results found for: \"LDLHDL\"      Procedures    Assessment & Plan   Problems Addressed this Visit       Hypertension    Hyperlipidemia    Type 2 diabetes mellitus with hyperglycemia, without long-term current use of insulin    Relevant Orders    Comprehensive Metabolic Panel    Hemoglobin A1c    Microalbumin / Creatinine Urine Ratio - Urine, Clean Catch    Encounter for annual health examination - Primary    Benign prostatic hyperplasia with urinary frequency    Relevant Orders    PSA DIAGNOSTIC     Diagnoses         Codes Comments    Encounter for annual health examination    -  Primary ICD-10-CM: Z00.00  ICD-9-CM: V70.0     Type 2 diabetes mellitus with hyperglycemia, without long-term current use of insulin     ICD-10-CM: E11.65  ICD-9-CM: 250.00, 790.29     Primary hypertension     ICD-10-CM: I10  ICD-9-CM: 401.9     Mixed hyperlipidemia     ICD-10-CM: " E78.2  ICD-9-CM: 272.2     Benign prostatic hyperplasia with urinary frequency     ICD-10-CM: N40.1, R35.0  ICD-9-CM: 600.01, 788.41           Preventive Counseling:  Encouraged to stay active.   HEP A UTD.  Pneumovax UTD.  Colonoscopy UTD.  Tdap 8/23.    Dentist UTD.  Optho UTD.      Orders Placed This Encounter   Procedures    PSA DIAGNOSTIC     Order Specific Question:   Release to patient     Answer:   Routine Release [[id number]]    Comprehensive Metabolic Panel     Order Specific Question:   Release to patient     Answer:   Routine Release [[id number]]    Hemoglobin A1c     Order Specific Question:   Release to patient     Answer:   Routine Release [[id number]]    Microalbumin / Creatinine Urine Ratio - Urine, Clean Catch     Order Specific Question:   Release to patient     Answer:   Routine Release [[id number]]       Current Outpatient Medications   Medication Sig Dispense Refill    Cetirizine HCl 10 MG capsule Take  by mouth.      dapagliflozin (Farxiga) 5 MG tablet tablet Take 1 tablet by mouth Daily. 90 tablet 3    ipratropium (ATROVENT) 0.03 % nasal spray 2 sprays into the nostril(s) as directed by provider 3 (Three) Times a Day. 30 mL 12    lisinopril (PRINIVIL,ZESTRIL) 20 MG tablet Take 1 tablet by mouth Daily. 90 tablet 3    meloxicam (MOBIC) 15 MG tablet Take 1 tablet by mouth Daily. 90 tablet 1    pioglitazone (ACTOS) 45 MG tablet Take 1 tablet by mouth Daily. 90 tablet 3    simvastatin (ZOCOR) 40 MG tablet Take 1 tablet by mouth Every Night. 90 tablet 3    tobramycin-dexAMETHasone (TOBRADEX) 0.3-0.1 % ophthalmic suspension SHAKE LIQUID AND INSTILL 1 DROP IN RIGHT EYE EVERY 2 HOURS      vitamin C (ASCORBIC ACID) 500 MG tablet Take 2 tablets by mouth Daily.       No current facility-administered medications for this visit.       No follow-ups on file.    There are no Patient Instructions on file for this visit.

## 2024-03-27 LAB
ALBUMIN SERPL-MCNC: 4.4 G/DL (ref 3.8–4.9)
ALBUMIN/CREAT UR: <6 MG/G CREAT (ref 0–29)
ALBUMIN/GLOB SERPL: 1.8 {RATIO} (ref 1.2–2.2)
ALP SERPL-CCNC: 76 IU/L (ref 44–121)
ALT SERPL-CCNC: 25 IU/L (ref 0–44)
AST SERPL-CCNC: 16 IU/L (ref 0–40)
BILIRUB SERPL-MCNC: 0.5 MG/DL (ref 0–1.2)
BUN SERPL-MCNC: 16 MG/DL (ref 6–24)
BUN/CREAT SERPL: 20 (ref 9–20)
CALCIUM SERPL-MCNC: 9.7 MG/DL (ref 8.7–10.2)
CHLORIDE SERPL-SCNC: 99 MMOL/L (ref 96–106)
CO2 SERPL-SCNC: 28 MMOL/L (ref 20–29)
CREAT SERPL-MCNC: 0.8 MG/DL (ref 0.76–1.27)
CREAT UR-MCNC: 52.1 MG/DL
EGFRCR SERPLBLD CKD-EPI 2021: 103 ML/MIN/1.73
GLOBULIN SER CALC-MCNC: 2.4 G/DL (ref 1.5–4.5)
GLUCOSE SERPL-MCNC: 98 MG/DL (ref 70–99)
HBA1C MFR BLD: 7.2 % (ref 4.8–5.6)
MICROALBUMIN UR-MCNC: <3 UG/ML
POTASSIUM SERPL-SCNC: 4.7 MMOL/L (ref 3.5–5.2)
PROT SERPL-MCNC: 6.8 G/DL (ref 6–8.5)
PSA SERPL-MCNC: 0.6 NG/ML (ref 0–4)
SODIUM SERPL-SCNC: 138 MMOL/L (ref 134–144)

## 2024-05-26 DIAGNOSIS — M19.90 ARTHRITIS: ICD-10-CM

## 2024-05-26 RX ORDER — MELOXICAM 15 MG/1
15 TABLET ORAL DAILY
Qty: 90 TABLET | Refills: 3 | Status: SHIPPED | OUTPATIENT
Start: 2024-05-26

## 2024-08-06 ENCOUNTER — OFFICE VISIT (OUTPATIENT)
Dept: FAMILY MEDICINE CLINIC | Facility: CLINIC | Age: 59
End: 2024-08-06
Payer: COMMERCIAL

## 2024-08-06 VITALS
OXYGEN SATURATION: 97 % | HEIGHT: 68 IN | SYSTOLIC BLOOD PRESSURE: 122 MMHG | BODY MASS INDEX: 35.04 KG/M2 | HEART RATE: 52 BPM | RESPIRATION RATE: 17 BRPM | WEIGHT: 231.2 LBS | TEMPERATURE: 97.7 F | DIASTOLIC BLOOD PRESSURE: 78 MMHG

## 2024-08-06 DIAGNOSIS — E11.65 TYPE 2 DIABETES MELLITUS WITH HYPERGLYCEMIA, WITHOUT LONG-TERM CURRENT USE OF INSULIN: Primary | ICD-10-CM

## 2024-08-06 DIAGNOSIS — E78.2 MIXED HYPERLIPIDEMIA: ICD-10-CM

## 2024-08-06 DIAGNOSIS — I10 PRIMARY HYPERTENSION: ICD-10-CM

## 2024-08-06 PROCEDURE — 99214 OFFICE O/P EST MOD 30 MIN: CPT | Performed by: FAMILY MEDICINE

## 2024-08-06 NOTE — PROGRESS NOTES
"Chief Complaint   Patient presents with    Hyperlipidemia    Diabetes       Subjective   Raúl Jarquin is a 59 y.o. male.     Hyperlipidemia  Pertinent negatives include no chest pain, myalgias or shortness of breath.   Diabetes  Pertinent negatives for hypoglycemia include no confusion. Pertinent negatives for diabetes include no blurred vision, no chest pain, no fatigue, no polyuria and no weakness.      F/U DM2.  Doing well with meds.  Decreased breads in diet.  Still no soft drinks.    F/U HTN.  Doing well with meds.   F\"U hyperlipidmeia.  Doing well wiht meds.      The following portions of the patient's history were reviewed and updated as appropriate: allergies, current medications, past family history, past medical history, past social history, past surgical history and problem list.    Review of Systems   Constitutional:  Negative for appetite change and fatigue.   HENT:  Negative for nosebleeds and sore throat.    Eyes:  Negative for blurred vision and visual disturbance.   Respiratory:  Negative for shortness of breath and wheezing.    Cardiovascular:  Negative for chest pain and leg swelling.   Gastrointestinal:  Negative for abdominal distention and abdominal pain.   Endocrine: Negative for cold intolerance and polyuria.   Genitourinary:  Negative for dysuria and hematuria.   Musculoskeletal:  Negative for arthralgias and myalgias.   Skin:  Negative for color change and rash.   Neurological:  Negative for weakness and confusion.   Psychiatric/Behavioral:  Negative for agitation and depressed mood.        Patient Active Problem List   Diagnosis    Hypertension    Hyperlipidemia    Type 2 diabetes mellitus with hyperglycemia, without long-term current use of insulin    Carpal tunnel syndrome    BPH (benign prostatic hyperplasia)    Arthritis    Leukemoid reaction    Acute right-sided low back pain    Hematuria    Sore throat    Nephrolithiasis    Encounter for annual health examination    Sebaceous cyst "    Encounter for hepatitis C screening test for low risk patient    Seasonal allergic rhinitis due to pollen    Bronchitis    Benign prostatic hyperplasia with urinary frequency       No Known Allergies      Current Outpatient Medications:     Cetirizine HCl 10 MG capsule, Take  by mouth., Disp: , Rfl:     dapagliflozin (Farxiga) 5 MG tablet tablet, Take 1 tablet by mouth Daily., Disp: 90 tablet, Rfl: 3    ipratropium (ATROVENT) 0.03 % nasal spray, 2 sprays into the nostril(s) as directed by provider 3 (Three) Times a Day., Disp: 30 mL, Rfl: 12    lisinopril (PRINIVIL,ZESTRIL) 20 MG tablet, Take 1 tablet by mouth Daily., Disp: 90 tablet, Rfl: 3    meloxicam (MOBIC) 15 MG tablet, TAKE 1 TABLET BY MOUTH DAILY, Disp: 90 tablet, Rfl: 3    pioglitazone (ACTOS) 45 MG tablet, Take 1 tablet by mouth Daily., Disp: 90 tablet, Rfl: 3    simvastatin (ZOCOR) 40 MG tablet, Take 1 tablet by mouth Every Night., Disp: 90 tablet, Rfl: 3    vitamin C (ASCORBIC ACID) 500 MG tablet, Take 2 tablets by mouth Daily., Disp: , Rfl:     tobramycin-dexAMETHasone (TOBRADEX) 0.3-0.1 % ophthalmic suspension, SHAKE LIQUID AND INSTILL 1 DROP IN RIGHT EYE EVERY 2 HOURS (Patient not taking: Reported on 8/6/2024), Disp: , Rfl:     Past Medical History:   Diagnosis Date    Acute bronchitis     Arthritis     BPH (benign prostatic hyperplasia)     Carpal tunnel syndrome     Colon cancer screening     Diabetes mellitus     Hyperlipidemia     Hypertension     Immunization deficiency     Urinary urgency        Past Surgical History:   Procedure Laterality Date    COLONOSCOPY  2019    normal    HAND SURGERY Right     Right Thumb surgery        Family History   Problem Relation Age of Onset    Diabetes Mother     Hypertension Mother        Social History     Tobacco Use    Smoking status: Never    Smokeless tobacco: Never   Substance Use Topics    Alcohol use: Yes            Objective     Vitals:    08/06/24 0747   BP: 122/78   Pulse: 52   Resp: 17   Temp:  97.7 °F (36.5 °C)   SpO2: 97%     Body mass index is 35.16 kg/m².    Physical Exam  Vitals reviewed.   Constitutional:       Appearance: He is well-developed. He is not diaphoretic.   HENT:      Head: Normocephalic and atraumatic.   Eyes:      General: No scleral icterus.     Pupils: Pupils are equal, round, and reactive to light.   Neck:      Thyroid: No thyromegaly.   Cardiovascular:      Rate and Rhythm: Normal rate and regular rhythm.      Heart sounds: No murmur heard.     No friction rub. No gallop.   Pulmonary:      Effort: Pulmonary effort is normal. No respiratory distress.      Breath sounds: No wheezing or rales.   Chest:      Chest wall: No tenderness.   Abdominal:      General: Bowel sounds are normal. There is no distension.      Palpations: Abdomen is soft.      Tenderness: There is no abdominal tenderness.   Musculoskeletal:         General: No deformity. Normal range of motion.   Lymphadenopathy:      Cervical: No cervical adenopathy.   Skin:     General: Skin is warm and dry.      Findings: No rash.   Neurological:      Cranial Nerves: No cranial nerve deficit.      Motor: No abnormal muscle tone.         Lab Results   Component Value Date    GLUCOSE 98 03/26/2024    BUN 16 03/26/2024    CREATININE 0.80 03/26/2024    EGFRIFNONA 101 02/17/2022    EGFRIFAFRI 116 02/17/2022    BCR 20 03/26/2024    K 4.7 03/26/2024    CO2 28 03/26/2024    CALCIUM 9.7 03/26/2024    PROTENTOTREF 6.8 03/26/2024    ALBUMIN 4.4 03/26/2024    LABIL2 1.8 03/26/2024    AST 16 03/26/2024    ALT 25 03/26/2024       WBC   Date Value Ref Range Status   09/17/2019 7.0 3.4 - 10.8 x10E3/uL Final   07/13/2019 18.99 (H) 4.5 - 11.0 10*3/uL Final     RBC   Date Value Ref Range Status   09/17/2019 5.09 4.14 - 5.80 x10E6/uL Final   07/13/2019 4.89 4.5 - 5.9 10*6/uL Final     Hemoglobin   Date Value Ref Range Status   09/17/2019 14.8 13.0 - 17.7 g/dL Final   07/13/2019 14.0 13.5 - 17.5 g/dL Final     Hematocrit   Date Value Ref Range Status    09/17/2019 43.1 37.5 - 51.0 % Final   07/13/2019 44.0 41.0 - 53.0 % Final     MCV   Date Value Ref Range Status   09/17/2019 85 79 - 97 fL Final   07/13/2019 90.0 80.0 - 100.0 fL Final     MCH   Date Value Ref Range Status   09/17/2019 29.1 26.6 - 33.0 pg Final   07/13/2019 28.6 26.0 - 34.0 pg Final     MCHC   Date Value Ref Range Status   09/17/2019 34.3 31.5 - 35.7 g/dL Final   07/13/2019 31.8 31.0 - 37.0 g/dL Final     RDW   Date Value Ref Range Status   09/17/2019 13.8 12.3 - 15.4 % Final   07/13/2019 14.0 12.0 - 16.8 % Final     MPV   Date Value Ref Range Status   07/13/2019 11.0 (H) 6.7 - 10.8 fL Final     Platelets   Date Value Ref Range Status   09/17/2019 274 150 - 450 x10E3/uL Final   07/13/2019 219 140 - 440 10*3/uL Final     Neutrophil Rel %   Date Value Ref Range Status   09/17/2019 59 Not Estab. % Final   07/13/2019 84.8 (H) 45 - 80 % Final     Lymphocyte Rel %   Date Value Ref Range Status   09/17/2019 27 Not Estab. % Final   07/13/2019 6.5 (L) 15 - 50 % Final     Monocyte Rel %   Date Value Ref Range Status   09/17/2019 10 Not Estab. % Final   07/13/2019 7.9 0 - 15 % Final     Eosinophil Rel %   Date Value Ref Range Status   09/17/2019 4 Not Estab. % Final     Eosinophil %   Date Value Ref Range Status   07/13/2019 0.3 0 - 7 % Final     Basophil Rel %   Date Value Ref Range Status   09/17/2019 0 Not Estab. % Final   07/13/2019 0.1 0 - 2 % Final     Immature Grans %   Date Value Ref Range Status   07/13/2019 0.4 (H) 0 % Final     Neutrophils Absolute   Date Value Ref Range Status   09/17/2019 4.1 1.4 - 7.0 x10E3/uL Final   07/13/2019 16.10 (H) 2.0 - 8.8 10*3/uL Final     Lymphocytes Absolute   Date Value Ref Range Status   09/17/2019 1.9 0.7 - 3.1 x10E3/uL Final   07/13/2019 1.24 0.7 - 5.5 10*3/uL Final     Monocytes Absolute   Date Value Ref Range Status   09/17/2019 0.7 0.1 - 0.9 x10E3/uL Final   07/13/2019 1.50 0.0 - 1.7 10*3/uL Final     Eosinophils Absolute   Date Value Ref Range Status  "  09/17/2019 0.3 0.0 - 0.4 x10E3/uL Final   07/13/2019 0.06 0.0 - 0.8 10*3/uL Final     Basophils Absolute   Date Value Ref Range Status   09/17/2019 0.0 0.0 - 0.2 x10E3/uL Final   07/13/2019 0.02 0.0 - 0.2 10*3/uL Final     Immature Grans, Absolute   Date Value Ref Range Status   07/13/2019 0.07 <1 10*3/uL Final     nRBC   Date Value Ref Range Status   07/13/2019 0 0 /100(WBC) Final       Lab Results   Component Value Date    HGBA1C 7.2 (H) 03/26/2024       Lab Results   Component Value Date    ZEPUGEDF29 510 10/13/2021       TSH   Date Value Ref Range Status   09/17/2019 1.470 0.450 - 4.500 uIU/mL Final       No results found for: \"CHOL\"  Lab Results   Component Value Date    TRIG 63 12/21/2023     Lab Results   Component Value Date    HDL 62 (H) 12/21/2023     Lab Results   Component Value Date    LDL 99 12/21/2023     Lab Results   Component Value Date    VLDL 12 12/21/2023     No results found for: \"LDLHDL\"      Procedures    Assessment & Plan   Problems Addressed this Visit       Hypertension    Relevant Orders    Comprehensive Metabolic Panel    Hyperlipidemia    Relevant Orders    Comprehensive Metabolic Panel    Lipid Panel With / Chol / HDL Ratio    Type 2 diabetes mellitus with hyperglycemia, without long-term current use of insulin - Primary    Relevant Orders    Comprehensive Metabolic Panel    Lipid Panel With / Chol / HDL Ratio    Hemoglobin A1c     Diagnoses         Codes Comments    Type 2 diabetes mellitus with hyperglycemia, without long-term current use of insulin    -  Primary ICD-10-CM: E11.65  ICD-9-CM: 250.00, 790.29     Primary hypertension     ICD-10-CM: I10  ICD-9-CM: 401.9     Mixed hyperlipidemia     ICD-10-CM: E78.2  ICD-9-CM: 272.2           DM2.  Uncontrolled.  Diet changes made.  Check A1c.  Contnue farxiga and wenceslao.    HTN.  Controlled.  Cotnnue lisinopril.  Check CMP.    Hyperlipidmeia.  Check CMP, FLP.  Contnue statin.    Orders Placed This Encounter   Procedures    Comprehensive " Metabolic Panel     Order Specific Question:   Release to patient     Answer:   Routine Release [[id number]]    Lipid Panel With / Chol / HDL Ratio     Order Specific Question:   Release to patient     Answer:   Routine Release [[id number]]    Hemoglobin A1c     Order Specific Question:   Release to patient     Answer:   Routine Release [[id number]]       Current Outpatient Medications   Medication Sig Dispense Refill    Cetirizine HCl 10 MG capsule Take  by mouth.      dapagliflozin (Farxiga) 5 MG tablet tablet Take 1 tablet by mouth Daily. 90 tablet 3    ipratropium (ATROVENT) 0.03 % nasal spray 2 sprays into the nostril(s) as directed by provider 3 (Three) Times a Day. 30 mL 12    lisinopril (PRINIVIL,ZESTRIL) 20 MG tablet Take 1 tablet by mouth Daily. 90 tablet 3    meloxicam (MOBIC) 15 MG tablet TAKE 1 TABLET BY MOUTH DAILY 90 tablet 3    pioglitazone (ACTOS) 45 MG tablet Take 1 tablet by mouth Daily. 90 tablet 3    simvastatin (ZOCOR) 40 MG tablet Take 1 tablet by mouth Every Night. 90 tablet 3    vitamin C (ASCORBIC ACID) 500 MG tablet Take 2 tablets by mouth Daily.      tobramycin-dexAMETHasone (TOBRADEX) 0.3-0.1 % ophthalmic suspension SHAKE LIQUID AND INSTILL 1 DROP IN RIGHT EYE EVERY 2 HOURS (Patient not taking: Reported on 8/6/2024)       No current facility-administered medications for this visit.       Raúl Jarquin had no medications administered during this visit.    Return in about 4 months (around 12/6/2024).    There are no Patient Instructions on file for this visit.

## 2024-08-07 LAB
ALBUMIN SERPL-MCNC: 4.4 G/DL (ref 3.5–5.2)
ALBUMIN/GLOB SERPL: 2.2 G/DL
ALP SERPL-CCNC: 78 U/L (ref 39–117)
ALT SERPL-CCNC: 20 U/L (ref 1–41)
AST SERPL-CCNC: 15 U/L (ref 1–40)
BILIRUB SERPL-MCNC: 0.5 MG/DL (ref 0–1.2)
BUN SERPL-MCNC: 19 MG/DL (ref 6–20)
BUN/CREAT SERPL: 20.9 (ref 7–25)
CALCIUM SERPL-MCNC: 9.7 MG/DL (ref 8.6–10.5)
CHLORIDE SERPL-SCNC: 102 MMOL/L (ref 98–107)
CHOLEST SERPL-MCNC: 192 MG/DL (ref 0–200)
CHOLEST/HDLC SERPL: 3.15 {RATIO}
CO2 SERPL-SCNC: 29.1 MMOL/L (ref 22–29)
CREAT SERPL-MCNC: 0.91 MG/DL (ref 0.76–1.27)
EGFRCR SERPLBLD CKD-EPI 2021: 98.3 ML/MIN/1.73
GLOBULIN SER CALC-MCNC: 2 GM/DL
GLUCOSE SERPL-MCNC: 119 MG/DL (ref 65–99)
HBA1C MFR BLD: 6.9 % (ref 4.8–5.6)
HDLC SERPL-MCNC: 61 MG/DL (ref 40–60)
LDLC SERPL CALC-MCNC: 111 MG/DL (ref 0–100)
POTASSIUM SERPL-SCNC: 5.4 MMOL/L (ref 3.5–5.2)
PROT SERPL-MCNC: 6.4 G/DL (ref 6–8.5)
SODIUM SERPL-SCNC: 141 MMOL/L (ref 136–145)
TRIGL SERPL-MCNC: 110 MG/DL (ref 0–150)
VLDLC SERPL CALC-MCNC: 20 MG/DL (ref 5–40)

## 2024-10-25 DIAGNOSIS — E11.65 TYPE 2 DIABETES MELLITUS WITH HYPERGLYCEMIA, WITHOUT LONG-TERM CURRENT USE OF INSULIN: ICD-10-CM

## 2024-10-25 DIAGNOSIS — E78.2 MIXED HYPERLIPIDEMIA: ICD-10-CM

## 2024-10-26 RX ORDER — SIMVASTATIN 40 MG
40 TABLET ORAL
Qty: 90 TABLET | Refills: 3 | Status: SHIPPED | OUTPATIENT
Start: 2024-10-26

## 2024-10-26 RX ORDER — LISINOPRIL 20 MG/1
20 TABLET ORAL DAILY
Qty: 90 TABLET | Refills: 3 | Status: SHIPPED | OUTPATIENT
Start: 2024-10-26

## 2024-10-26 RX ORDER — PIOGLITAZONEHYDROCHLORIDE 45 MG/1
45 TABLET ORAL DAILY
Qty: 90 TABLET | Refills: 3 | Status: SHIPPED | OUTPATIENT
Start: 2024-10-26

## 2024-11-13 DIAGNOSIS — E11.65 TYPE 2 DIABETES MELLITUS WITH HYPERGLYCEMIA, WITHOUT LONG-TERM CURRENT USE OF INSULIN: ICD-10-CM

## 2024-11-13 RX ORDER — DAPAGLIFLOZIN 5 MG/1
5 TABLET, FILM COATED ORAL DAILY
Qty: 90 TABLET | Refills: 3 | Status: SHIPPED | OUTPATIENT
Start: 2024-11-13

## 2024-12-23 ENCOUNTER — OFFICE VISIT (OUTPATIENT)
Dept: FAMILY MEDICINE CLINIC | Facility: CLINIC | Age: 59
End: 2024-12-23
Payer: COMMERCIAL

## 2024-12-23 VITALS
DIASTOLIC BLOOD PRESSURE: 70 MMHG | HEART RATE: 76 BPM | OXYGEN SATURATION: 96 % | WEIGHT: 230.2 LBS | RESPIRATION RATE: 17 BRPM | HEIGHT: 68 IN | BODY MASS INDEX: 34.89 KG/M2 | SYSTOLIC BLOOD PRESSURE: 122 MMHG | TEMPERATURE: 97.5 F

## 2024-12-23 DIAGNOSIS — E11.65 TYPE 2 DIABETES MELLITUS WITH HYPERGLYCEMIA, WITHOUT LONG-TERM CURRENT USE OF INSULIN: Primary | ICD-10-CM

## 2024-12-23 DIAGNOSIS — I10 PRIMARY HYPERTENSION: ICD-10-CM

## 2024-12-23 DIAGNOSIS — E78.2 MIXED HYPERLIPIDEMIA: ICD-10-CM

## 2024-12-23 PROCEDURE — 99214 OFFICE O/P EST MOD 30 MIN: CPT | Performed by: FAMILY MEDICINE

## 2024-12-23 NOTE — PROGRESS NOTES
Chief Complaint   Patient presents with    Diabetes    Hypertension    Hyperlipidemia       Subjective   Raúl Jarquin is a 59 y.o. male.     Diabetes  Pertinent negatives for hypoglycemia include no confusion. Pertinent negatives for diabetes include no blurred vision, no chest pain, no fatigue, no polyuria and no weakness.   Hypertension  Pertinent negatives include no blurred vision, chest pain or shortness of breath.   Hyperlipidemia  Pertinent negatives include no chest pain, myalgias or shortness of breath.      F/U DM2.  Doing well with meds.    F/U HTN.  Dong well with meds.     fU Hyperlipidmeoa.  No myalgias.    The following portions of the patient's history were reviewed and updated as appropriate: allergies, current medications, past family history, past medical history, past social history, past surgical history and problem list.    Review of Systems   Constitutional:  Negative for appetite change and fatigue.   HENT:  Negative for nosebleeds and sore throat.    Eyes:  Negative for blurred vision and visual disturbance.   Respiratory:  Negative for shortness of breath and wheezing.    Cardiovascular:  Negative for chest pain and leg swelling.   Gastrointestinal:  Negative for abdominal distention and abdominal pain.   Endocrine: Negative for cold intolerance and polyuria.   Genitourinary:  Negative for dysuria and hematuria.   Musculoskeletal:  Negative for arthralgias and myalgias.   Skin:  Negative for color change and rash.   Neurological:  Negative for weakness and confusion.   Psychiatric/Behavioral:  Negative for agitation and depressed mood.        Patient Active Problem List   Diagnosis    Hypertension    Hyperlipidemia    Type 2 diabetes mellitus with hyperglycemia, without long-term current use of insulin    Carpal tunnel syndrome    BPH (benign prostatic hyperplasia)    Arthritis    Leukemoid reaction    Acute right-sided low back pain    Hematuria    Sore throat    Nephrolithiasis     Encounter for annual health examination    Sebaceous cyst    Encounter for hepatitis C screening test for low risk patient    Seasonal allergic rhinitis due to pollen    Bronchitis    Benign prostatic hyperplasia with urinary frequency       No Known Allergies      Current Outpatient Medications:     Cetirizine HCl 10 MG capsule, Take  by mouth., Disp: , Rfl:     Farxiga 5 MG tablet tablet, TAKE 1 TABLET BY MOUTH DAILY, Disp: 90 tablet, Rfl: 3    ipratropium (ATROVENT) 0.03 % nasal spray, 2 sprays into the nostril(s) as directed by provider 3 (Three) Times a Day., Disp: 30 mL, Rfl: 12    lisinopril (PRINIVIL,ZESTRIL) 20 MG tablet, TAKE 1 TABLET BY MOUTH DAILY, Disp: 90 tablet, Rfl: 3    meloxicam (MOBIC) 15 MG tablet, TAKE 1 TABLET BY MOUTH DAILY, Disp: 90 tablet, Rfl: 3    pioglitazone (ACTOS) 45 MG tablet, TAKE 1 TABLET BY MOUTH DAILY, Disp: 90 tablet, Rfl: 3    simvastatin (ZOCOR) 40 MG tablet, TAKE 1 TABLET BY MOUTH AT NIGHT, Disp: 90 tablet, Rfl: 3    vitamin C (ASCORBIC ACID) 500 MG tablet, Take 2 tablets by mouth Daily., Disp: , Rfl:     tobramycin-dexAMETHasone (TOBRADEX) 0.3-0.1 % ophthalmic suspension, SHAKE LIQUID AND INSTILL 1 DROP IN RIGHT EYE EVERY 2 HOURS (Patient not taking: Reported on 12/23/2024), Disp: , Rfl:     Past Medical History:   Diagnosis Date    Acute bronchitis     Arthritis     BPH (benign prostatic hyperplasia)     Carpal tunnel syndrome     Colon cancer screening     Diabetes mellitus     Hyperlipidemia     Hypertension     Immunization deficiency     Urinary urgency        Past Surgical History:   Procedure Laterality Date    COLONOSCOPY  2019    normal    HAND SURGERY Right     Right Thumb surgery        Family History   Problem Relation Age of Onset    Diabetes Mother     Hypertension Mother        Social History     Tobacco Use    Smoking status: Never    Smokeless tobacco: Never   Substance Use Topics    Alcohol use: Yes            Objective     Vitals:    12/23/24 0854   BP:  122/70   Pulse: 76   Resp: 17   Temp: 97.5 °F (36.4 °C)   SpO2: 96%     Body mass index is 35.01 kg/m².    Physical Exam  Vitals reviewed.   Constitutional:       Appearance: He is well-developed. He is not diaphoretic.   HENT:      Head: Normocephalic and atraumatic.   Eyes:      General: No scleral icterus.     Pupils: Pupils are equal, round, and reactive to light.   Neck:      Thyroid: No thyromegaly.   Cardiovascular:      Rate and Rhythm: Normal rate and regular rhythm.      Heart sounds: No murmur heard.     No friction rub. No gallop.   Pulmonary:      Effort: Pulmonary effort is normal. No respiratory distress.      Breath sounds: No wheezing or rales.   Chest:      Chest wall: No tenderness.   Abdominal:      General: Bowel sounds are normal. There is no distension.      Palpations: Abdomen is soft.      Tenderness: There is no abdominal tenderness.   Musculoskeletal:         General: No deformity. Normal range of motion.   Lymphadenopathy:      Cervical: No cervical adenopathy.   Skin:     General: Skin is warm and dry.      Findings: No rash.   Neurological:      Cranial Nerves: No cranial nerve deficit.      Motor: No abnormal muscle tone.         Lab Results   Component Value Date    GLUCOSE 119 (H) 08/06/2024    BUN 19 08/06/2024    CREATININE 0.91 08/06/2024    EGFRIFNONA 101 02/17/2022    EGFRIFAFRI 116 02/17/2022    BCR 20.9 08/06/2024    K 5.4 (H) 08/06/2024    CO2 29.1 (H) 08/06/2024    CALCIUM 9.7 08/06/2024    PROTENTOTREF 6.4 08/06/2024    ALBUMIN 4.4 08/06/2024    LABIL2 2.2 08/06/2024    AST 15 08/06/2024    ALT 20 08/06/2024       WBC   Date Value Ref Range Status   09/17/2019 7.0 3.4 - 10.8 x10E3/uL Final   07/13/2019 18.99 (H) 4.5 - 11.0 10*3/uL Final     RBC   Date Value Ref Range Status   09/17/2019 5.09 4.14 - 5.80 x10E6/uL Final   07/13/2019 4.89 4.5 - 5.9 10*6/uL Final     Hemoglobin   Date Value Ref Range Status   09/17/2019 14.8 13.0 - 17.7 g/dL Final   07/13/2019 14.0 13.5 - 17.5  g/dL Final     Hematocrit   Date Value Ref Range Status   09/17/2019 43.1 37.5 - 51.0 % Final   07/13/2019 44.0 41.0 - 53.0 % Final     MCV   Date Value Ref Range Status   09/17/2019 85 79 - 97 fL Final   07/13/2019 90.0 80.0 - 100.0 fL Final     MCH   Date Value Ref Range Status   09/17/2019 29.1 26.6 - 33.0 pg Final   07/13/2019 28.6 26.0 - 34.0 pg Final     MCHC   Date Value Ref Range Status   09/17/2019 34.3 31.5 - 35.7 g/dL Final   07/13/2019 31.8 31.0 - 37.0 g/dL Final     RDW   Date Value Ref Range Status   09/17/2019 13.8 12.3 - 15.4 % Final   07/13/2019 14.0 12.0 - 16.8 % Final     MPV   Date Value Ref Range Status   07/13/2019 11.0 (H) 6.7 - 10.8 fL Final     Platelets   Date Value Ref Range Status   09/17/2019 274 150 - 450 x10E3/uL Final   07/13/2019 219 140 - 440 10*3/uL Final     Neutrophil Rel %   Date Value Ref Range Status   09/17/2019 59 Not Estab. % Final   07/13/2019 84.8 (H) 45 - 80 % Final     Lymphocyte Rel %   Date Value Ref Range Status   09/17/2019 27 Not Estab. % Final   07/13/2019 6.5 (L) 15 - 50 % Final     Monocyte Rel %   Date Value Ref Range Status   09/17/2019 10 Not Estab. % Final   07/13/2019 7.9 0 - 15 % Final     Eosinophil Rel %   Date Value Ref Range Status   09/17/2019 4 Not Estab. % Final     Eosinophil %   Date Value Ref Range Status   07/13/2019 0.3 0 - 7 % Final     Basophil Rel %   Date Value Ref Range Status   09/17/2019 0 Not Estab. % Final   07/13/2019 0.1 0 - 2 % Final     Immature Grans %   Date Value Ref Range Status   07/13/2019 0.4 (H) 0 % Final     Neutrophils Absolute   Date Value Ref Range Status   09/17/2019 4.1 1.4 - 7.0 x10E3/uL Final   07/13/2019 16.10 (H) 2.0 - 8.8 10*3/uL Final     Lymphocytes Absolute   Date Value Ref Range Status   09/17/2019 1.9 0.7 - 3.1 x10E3/uL Final   07/13/2019 1.24 0.7 - 5.5 10*3/uL Final     Monocytes Absolute   Date Value Ref Range Status   09/17/2019 0.7 0.1 - 0.9 x10E3/uL Final   07/13/2019 1.50 0.0 - 1.7 10*3/uL Final  "    Eosinophils Absolute   Date Value Ref Range Status   09/17/2019 0.3 0.0 - 0.4 x10E3/uL Final   07/13/2019 0.06 0.0 - 0.8 10*3/uL Final     Basophils Absolute   Date Value Ref Range Status   09/17/2019 0.0 0.0 - 0.2 x10E3/uL Final   07/13/2019 0.02 0.0 - 0.2 10*3/uL Final     Immature Grans, Absolute   Date Value Ref Range Status   07/13/2019 0.07 <1 10*3/uL Final     nRBC   Date Value Ref Range Status   07/13/2019 0 0 /100(WBC) Final       Lab Results   Component Value Date    HGBA1C 6.90 (H) 08/06/2024       Lab Results   Component Value Date    MWHOCTSF29 510 10/13/2021       TSH   Date Value Ref Range Status   09/17/2019 1.470 0.450 - 4.500 uIU/mL Final       No results found for: \"CHOL\"  Lab Results   Component Value Date    TRIG 110 08/06/2024     Lab Results   Component Value Date    HDL 61 (H) 08/06/2024     Lab Results   Component Value Date     (H) 08/06/2024     Lab Results   Component Value Date    VLDL 20 08/06/2024     No results found for: \"LDLHDL\"      Procedures    Assessment & Plan   Problems Addressed this Visit       Hypertension    Hyperlipidemia    Relevant Orders    Comprehensive Metabolic Panel    Lipid Panel With / Chol / HDL Ratio    Type 2 diabetes mellitus with hyperglycemia, without long-term current use of insulin - Primary    Relevant Orders    Comprehensive Metabolic Panel    Lipid Panel With / Chol / HDL Ratio    Hemoglobin A1c     Diagnoses         Codes Comments    Type 2 diabetes mellitus with hyperglycemia, without long-term current use of insulin    -  Primary ICD-10-CM: E11.65  ICD-9-CM: 250.00, 790.29     Primary hypertension     ICD-10-CM: I10  ICD-9-CM: 401.9     Mixed hyperlipidemia     ICD-10-CM: E78.2  ICD-9-CM: 272.2           DM2.  Doing well with meds.  Check A1c.    HTN.  Controlled.  Contnue meds.    Hyperlipidmeia . Controlled.  Continue statin.  Check FLP, CMP.    Orders Placed This Encounter   Procedures    Comprehensive Metabolic Panel     Order " Specific Question:   Release to patient     Answer:   Routine Release [[id number]]    Lipid Panel With / Chol / HDL Ratio     Order Specific Question:   Release to patient     Answer:   Routine Release [[id number]]    Hemoglobin A1c     Order Specific Question:   Release to patient     Answer:   Routine Release [[id number]]       Current Outpatient Medications   Medication Sig Dispense Refill    Cetirizine HCl 10 MG capsule Take  by mouth.      Farxiga 5 MG tablet tablet TAKE 1 TABLET BY MOUTH DAILY 90 tablet 3    ipratropium (ATROVENT) 0.03 % nasal spray 2 sprays into the nostril(s) as directed by provider 3 (Three) Times a Day. 30 mL 12    lisinopril (PRINIVIL,ZESTRIL) 20 MG tablet TAKE 1 TABLET BY MOUTH DAILY 90 tablet 3    meloxicam (MOBIC) 15 MG tablet TAKE 1 TABLET BY MOUTH DAILY 90 tablet 3    pioglitazone (ACTOS) 45 MG tablet TAKE 1 TABLET BY MOUTH DAILY 90 tablet 3    simvastatin (ZOCOR) 40 MG tablet TAKE 1 TABLET BY MOUTH AT NIGHT 90 tablet 3    vitamin C (ASCORBIC ACID) 500 MG tablet Take 2 tablets by mouth Daily.      tobramycin-dexAMETHasone (TOBRADEX) 0.3-0.1 % ophthalmic suspension SHAKE LIQUID AND INSTILL 1 DROP IN RIGHT EYE EVERY 2 HOURS (Patient not taking: Reported on 12/23/2024)       No current facility-administered medications for this visit.       Raúl Jarquin had no medications administered during this visit.    Return in about 4 months (around 4/23/2025).    There are no Patient Instructions on file for this visit.

## 2024-12-24 DIAGNOSIS — E78.2 MIXED HYPERLIPIDEMIA: ICD-10-CM

## 2024-12-24 DIAGNOSIS — E78.2 MIXED HYPERLIPIDEMIA: Primary | ICD-10-CM

## 2024-12-24 LAB
ALBUMIN SERPL-MCNC: 4.5 G/DL (ref 3.8–4.9)
ALP SERPL-CCNC: 82 IU/L (ref 44–121)
ALT SERPL-CCNC: 21 IU/L (ref 0–44)
AST SERPL-CCNC: 15 IU/L (ref 0–40)
BILIRUB SERPL-MCNC: 0.5 MG/DL (ref 0–1.2)
BUN SERPL-MCNC: 26 MG/DL (ref 6–24)
BUN/CREAT SERPL: 30 (ref 9–20)
CALCIUM SERPL-MCNC: 9.4 MG/DL (ref 8.7–10.2)
CHLORIDE SERPL-SCNC: 101 MMOL/L (ref 96–106)
CHOLEST SERPL-MCNC: 210 MG/DL (ref 100–199)
CHOLEST/HDLC SERPL: 3.4 RATIO (ref 0–5)
CO2 SERPL-SCNC: 24 MMOL/L (ref 20–29)
CREAT SERPL-MCNC: 0.87 MG/DL (ref 0.76–1.27)
EGFRCR SERPLBLD CKD-EPI 2021: 101 ML/MIN/1.73
GLOBULIN SER CALC-MCNC: 2.4 G/DL (ref 1.5–4.5)
GLUCOSE SERPL-MCNC: 108 MG/DL (ref 70–99)
HBA1C MFR BLD: 7.3 % (ref 4.8–5.6)
HDLC SERPL-MCNC: 61 MG/DL
LDLC SERPL CALC-MCNC: 128 MG/DL (ref 0–99)
POTASSIUM SERPL-SCNC: 4.9 MMOL/L (ref 3.5–5.2)
PROT SERPL-MCNC: 6.9 G/DL (ref 6–8.5)
SODIUM SERPL-SCNC: 138 MMOL/L (ref 134–144)
TRIGL SERPL-MCNC: 120 MG/DL (ref 0–149)
VLDLC SERPL CALC-MCNC: 21 MG/DL (ref 5–40)

## 2024-12-24 RX ORDER — ATORVASTATIN CALCIUM 40 MG/1
40 TABLET, FILM COATED ORAL DAILY
Qty: 90 TABLET | Refills: 3 | Status: SHIPPED | OUTPATIENT
Start: 2024-12-24

## 2024-12-24 RX ORDER — ATORVASTATIN CALCIUM 40 MG/1
40 TABLET, FILM COATED ORAL DAILY
Qty: 90 TABLET | Refills: 3 | Status: SHIPPED | OUTPATIENT
Start: 2024-12-24 | End: 2024-12-24 | Stop reason: SDUPTHER

## 2025-04-08 DIAGNOSIS — M19.90 ARTHRITIS: ICD-10-CM

## 2025-04-09 RX ORDER — MELOXICAM 15 MG/1
15 TABLET ORAL DAILY
Qty: 90 TABLET | Refills: 3 | Status: SHIPPED | OUTPATIENT
Start: 2025-04-09

## 2025-04-21 ENCOUNTER — OFFICE VISIT (OUTPATIENT)
Dept: FAMILY MEDICINE CLINIC | Facility: CLINIC | Age: 60
End: 2025-04-21
Payer: COMMERCIAL

## 2025-04-21 VITALS
RESPIRATION RATE: 17 BRPM | OXYGEN SATURATION: 95 % | DIASTOLIC BLOOD PRESSURE: 70 MMHG | HEIGHT: 68 IN | BODY MASS INDEX: 33.68 KG/M2 | TEMPERATURE: 98.1 F | SYSTOLIC BLOOD PRESSURE: 122 MMHG | WEIGHT: 222.2 LBS | HEART RATE: 71 BPM

## 2025-04-21 DIAGNOSIS — I10 PRIMARY HYPERTENSION: ICD-10-CM

## 2025-04-21 DIAGNOSIS — R35.0 BENIGN PROSTATIC HYPERPLASIA WITH URINARY FREQUENCY: ICD-10-CM

## 2025-04-21 DIAGNOSIS — E78.2 MIXED HYPERLIPIDEMIA: ICD-10-CM

## 2025-04-21 DIAGNOSIS — N40.1 BENIGN PROSTATIC HYPERPLASIA WITH URINARY FREQUENCY: ICD-10-CM

## 2025-04-21 DIAGNOSIS — E11.65 TYPE 2 DIABETES MELLITUS WITH HYPERGLYCEMIA, WITHOUT LONG-TERM CURRENT USE OF INSULIN: Primary | ICD-10-CM

## 2025-04-21 PROCEDURE — 90471 IMMUNIZATION ADMIN: CPT | Performed by: FAMILY MEDICINE

## 2025-04-21 PROCEDURE — 99214 OFFICE O/P EST MOD 30 MIN: CPT | Performed by: FAMILY MEDICINE

## 2025-04-21 PROCEDURE — 90677 PCV20 VACCINE IM: CPT | Performed by: FAMILY MEDICINE

## 2025-04-21 NOTE — PROGRESS NOTES
Chief Complaint   Patient presents with   • Diabetes   • Hypertension   • Hyperlipidemia       Subjective   Raúl Jarquin is a 60 y.o. male.     Diabetes  Pertinent negatives for hypoglycemia include no confusion. Pertinent negatives for diabetes include no blurred vision, no chest pain, no fatigue, no polyuria and no weakness.   Hypertension  Pertinent negatives include no blurred vision, chest pain or shortness of breath.   Hyperlipidemia  Pertinent negatives include no chest pain, myalgias or shortness of breath.      F/U DM2.  Doing well with meds.  Down on wt 28 lbs form 2023.    F/U hyperlipidmeia.  No myalgias.    The following portions of the patient's history were reviewed and updated as appropriate: allergies, current medications, past family history, past medical history, past social history, past surgical history and problem list.    Review of Systems   Constitutional:  Negative for appetite change and fatigue.   HENT:  Negative for nosebleeds and sore throat.    Eyes:  Negative for blurred vision and visual disturbance.   Respiratory:  Negative for shortness of breath and wheezing.    Cardiovascular:  Negative for chest pain and leg swelling.   Gastrointestinal:  Negative for abdominal distention and abdominal pain.   Endocrine: Negative for cold intolerance and polyuria.   Genitourinary:  Negative for dysuria and hematuria.   Musculoskeletal:  Negative for arthralgias and myalgias.   Skin:  Negative for color change and rash.   Neurological:  Negative for weakness and confusion.   Psychiatric/Behavioral:  Negative for agitation and depressed mood.        Patient Active Problem List   Diagnosis   • Hypertension   • Hyperlipidemia   • Type 2 diabetes mellitus with hyperglycemia, without long-term current use of insulin   • Carpal tunnel syndrome   • BPH (benign prostatic hyperplasia)   • Arthritis   • Leukemoid reaction   • Acute right-sided low back pain   • Hematuria   • Sore throat   •  Nephrolithiasis   • Encounter for annual health examination   • Sebaceous cyst   • Encounter for hepatitis C screening test for low risk patient   • Seasonal allergic rhinitis due to pollen   • Bronchitis   • Benign prostatic hyperplasia with urinary frequency       No Known Allergies      Current Outpatient Medications:   •  atorvastatin (Lipitor) 40 MG tablet, Take 1 tablet by mouth Daily., Disp: 90 tablet, Rfl: 3  •  Cetirizine HCl 10 MG capsule, Take  by mouth., Disp: , Rfl:   •  Farxiga 5 MG tablet tablet, TAKE 1 TABLET BY MOUTH DAILY, Disp: 90 tablet, Rfl: 3  •  ipratropium (ATROVENT) 0.03 % nasal spray, 2 sprays into the nostril(s) as directed by provider 3 (Three) Times a Day., Disp: 30 mL, Rfl: 12  •  lisinopril (PRINIVIL,ZESTRIL) 20 MG tablet, TAKE 1 TABLET BY MOUTH DAILY, Disp: 90 tablet, Rfl: 3  •  meloxicam (MOBIC) 15 MG tablet, TAKE 1 TABLET BY MOUTH DAILY, Disp: 90 tablet, Rfl: 3  •  pioglitazone (ACTOS) 45 MG tablet, TAKE 1 TABLET BY MOUTH DAILY, Disp: 90 tablet, Rfl: 3  •  tobramycin-dexAMETHasone (TOBRADEX) 0.3-0.1 % ophthalmic suspension, , Disp: , Rfl:   •  vitamin C (ASCORBIC ACID) 500 MG tablet, Take 2 tablets by mouth Daily., Disp: , Rfl:     Past Medical History:   Diagnosis Date   • Acute bronchitis    • Arthritis    • BPH (benign prostatic hyperplasia)    • Carpal tunnel syndrome    • Colon cancer screening    • Diabetes mellitus    • Hyperlipidemia    • Hypertension    • Immunization deficiency    • Urinary urgency        Past Surgical History:   Procedure Laterality Date   • COLONOSCOPY  2019    normal   • HAND SURGERY Right     Right Thumb surgery        Family History   Problem Relation Age of Onset   • Diabetes Mother    • Hypertension Mother        Social History     Tobacco Use   • Smoking status: Never   • Smokeless tobacco: Never   Substance Use Topics   • Alcohol use: Yes            Objective     Vitals:    04/21/25 0915   BP: 122/70   Pulse: 71   Resp: 17   Temp: 98.1 °F (36.7  °C)   SpO2: 95%     Body mass index is 33.79 kg/m².    Physical Exam  Vitals reviewed.   Constitutional:       Appearance: He is well-developed. He is not diaphoretic.   HENT:      Head: Normocephalic and atraumatic.   Eyes:      General: No scleral icterus.     Pupils: Pupils are equal, round, and reactive to light.   Neck:      Thyroid: No thyromegaly.   Cardiovascular:      Rate and Rhythm: Normal rate and regular rhythm.      Heart sounds: No murmur heard.     No friction rub. No gallop.   Pulmonary:      Effort: Pulmonary effort is normal. No respiratory distress.      Breath sounds: No wheezing or rales.   Chest:      Chest wall: No tenderness.   Abdominal:      General: Bowel sounds are normal. There is no distension.      Palpations: Abdomen is soft.      Tenderness: There is no abdominal tenderness.   Musculoskeletal:         General: No deformity. Normal range of motion.   Lymphadenopathy:      Cervical: No cervical adenopathy.   Skin:     General: Skin is warm and dry.      Findings: No rash.   Neurological:      Cranial Nerves: No cranial nerve deficit.      Motor: No abnormal muscle tone.       Lab Results   Component Value Date    GLUCOSE 108 (H) 12/23/2024    BUN 26 (H) 12/23/2024    CREATININE 0.87 12/23/2024    EGFRIFNONA 101 02/17/2022    EGFRIFAFRI 116 02/17/2022    BCR 30 (H) 12/23/2024    K 4.9 12/23/2024    CO2 24 12/23/2024    CALCIUM 9.4 12/23/2024    ALBUMIN 4.5 12/23/2024    LABIL2 1.2 07/13/2019    AST 15 12/23/2024    ALT 21 12/23/2024       WBC   Date Value Ref Range Status   09/17/2019 7.0 3.4 - 10.8 x10E3/uL Final   07/13/2019 18.99 (H) 4.5 - 11.0 10*3/uL Final     RBC   Date Value Ref Range Status   09/17/2019 5.09 4.14 - 5.80 x10E6/uL Final   07/13/2019 4.89 4.5 - 5.9 10*6/uL Final     Hemoglobin   Date Value Ref Range Status   09/17/2019 14.8 13.0 - 17.7 g/dL Final   07/13/2019 14.0 13.5 - 17.5 g/dL Final     Hematocrit   Date Value Ref Range Status   09/17/2019 43.1 37.5 - 51.0 %  Final   07/13/2019 44.0 41.0 - 53.0 % Final     MCV   Date Value Ref Range Status   09/17/2019 85 79 - 97 fL Final   07/13/2019 90.0 80.0 - 100.0 fL Final     MCH   Date Value Ref Range Status   09/17/2019 29.1 26.6 - 33.0 pg Final   07/13/2019 28.6 26.0 - 34.0 pg Final     MCHC   Date Value Ref Range Status   09/17/2019 34.3 31.5 - 35.7 g/dL Final   07/13/2019 31.8 31.0 - 37.0 g/dL Final     RDW   Date Value Ref Range Status   09/17/2019 13.8 12.3 - 15.4 % Final   07/13/2019 14.0 12.0 - 16.8 % Final     MPV   Date Value Ref Range Status   07/13/2019 11.0 (H) 6.7 - 10.8 fL Final     Platelets   Date Value Ref Range Status   09/17/2019 274 150 - 450 x10E3/uL Final   07/13/2019 219 140 - 440 10*3/uL Final     Neutrophil Rel %   Date Value Ref Range Status   09/17/2019 59 Not Estab. % Final   07/13/2019 84.8 (H) 45 - 80 % Final     Lymphocyte Rel %   Date Value Ref Range Status   09/17/2019 27 Not Estab. % Final   07/13/2019 6.5 (L) 15 - 50 % Final     Monocyte Rel %   Date Value Ref Range Status   09/17/2019 10 Not Estab. % Final   07/13/2019 7.9 0 - 15 % Final     Eosinophil Rel %   Date Value Ref Range Status   09/17/2019 4 Not Estab. % Final     Eosinophil %   Date Value Ref Range Status   07/13/2019 0.3 0 - 7 % Final     Basophil Rel %   Date Value Ref Range Status   09/17/2019 0 Not Estab. % Final   07/13/2019 0.1 0 - 2 % Final     Immature Grans %   Date Value Ref Range Status   07/13/2019 0.4 (H) 0 % Final     Neutrophils Absolute   Date Value Ref Range Status   09/17/2019 4.1 1.4 - 7.0 x10E3/uL Final   07/13/2019 16.10 (H) 2.0 - 8.8 10*3/uL Final     Lymphocytes Absolute   Date Value Ref Range Status   09/17/2019 1.9 0.7 - 3.1 x10E3/uL Final   07/13/2019 1.24 0.7 - 5.5 10*3/uL Final     Monocytes Absolute   Date Value Ref Range Status   09/17/2019 0.7 0.1 - 0.9 x10E3/uL Final   07/13/2019 1.50 0.0 - 1.7 10*3/uL Final     Eosinophils Absolute   Date Value Ref Range Status   09/17/2019 0.3 0.0 - 0.4 x10E3/uL  "Final   07/13/2019 0.06 0.0 - 0.8 10*3/uL Final     Basophils Absolute   Date Value Ref Range Status   09/17/2019 0.0 0.0 - 0.2 x10E3/uL Final   07/13/2019 0.02 0.0 - 0.2 10*3/uL Final     Immature Grans, Absolute   Date Value Ref Range Status   07/13/2019 0.07 <1 10*3/uL Final     nRBC   Date Value Ref Range Status   07/13/2019 0 0 /100(WBC) Final       Lab Results   Component Value Date    HGBA1C 7.3 (H) 12/23/2024       Lab Results   Component Value Date    [id number] 510 10/13/2021       TSH   Date Value Ref Range Status   09/17/2019 1.470 0.450 - 4.500 uIU/mL Final       No results found for: \"CHOL\"  Lab Results   Component Value Date    TRIG 120 12/23/2024     Lab Results   Component Value Date    HDL 61 12/23/2024     Lab Results   Component Value Date     (H) 12/23/2024     Lab Results   Component Value Date    VLDL 21 12/23/2024     No results found for: \"LDLHDL\"      Procedures    Assessment & Plan   Problems Addressed this Visit       Hypertension    Hyperlipidemia    Type 2 diabetes mellitus with hyperglycemia, without long-term current use of insulin - Primary    Relevant Orders    Comprehensive Metabolic Panel    Lipid Panel With / Chol / HDL Ratio    Hemoglobin A1c    Microalbumin / Creatinine Urine Ratio - Urine, Clean Catch    BPH (benign prostatic hyperplasia)    Relevant Orders    PSA DIAGNOSTIC     Diagnoses         Codes Comments      Type 2 diabetes mellitus with hyperglycemia, without long-term current use of insulin    -  Primary ICD-10-CM: E11.65  ICD-9-CM: 250.00, 790.29       Mixed hyperlipidemia     ICD-10-CM: E78.2  ICD-9-CM: 272.2       Primary hypertension     ICD-10-CM: I10  ICD-9-CM: 401.9       Benign prostatic hyperplasia with urinary frequency     ICD-10-CM: N40.1, R35.0  ICD-9-CM: 600.01, 788.41           DM2.   Uncontrolled.  Check A1c, CMP. Urine micro.  HTN.  Contorlled.  Continue lisinopril.  Check CMP.    Hyperlipidmeia.  Uncontrolled.  Check FLP.  Contnue " atorvastatin.      Orders Placed This Encounter   Procedures   • Pneumococcal Conjugate Vaccine 20-Valent All   • Comprehensive Metabolic Panel     Release to patient:   Routine Release [[id number]]   • Lipid Panel With / Chol / HDL Ratio     Release to patient:   Routine Release [[id number]]   • Hemoglobin A1c     Release to patient:   Routine Release [[id number]]   • Microalbumin / Creatinine Urine Ratio - Urine, Clean Catch     Release to patient:   Routine Release [[id number]]   • PSA DIAGNOSTIC     Release to patient:   Routine Release [[id number]]       Current Outpatient Medications   Medication Sig Dispense Refill   • atorvastatin (Lipitor) 40 MG tablet Take 1 tablet by mouth Daily. 90 tablet 3   • Cetirizine HCl 10 MG capsule Take  by mouth.     • Farxiga 5 MG tablet tablet TAKE 1 TABLET BY MOUTH DAILY 90 tablet 3   • ipratropium (ATROVENT) 0.03 % nasal spray 2 sprays into the nostril(s) as directed by provider 3 (Three) Times a Day. 30 mL 12   • lisinopril (PRINIVIL,ZESTRIL) 20 MG tablet TAKE 1 TABLET BY MOUTH DAILY 90 tablet 3   • meloxicam (MOBIC) 15 MG tablet TAKE 1 TABLET BY MOUTH DAILY 90 tablet 3   • pioglitazone (ACTOS) 45 MG tablet TAKE 1 TABLET BY MOUTH DAILY 90 tablet 3   • tobramycin-dexAMETHasone (TOBRADEX) 0.3-0.1 % ophthalmic suspension      • vitamin C (ASCORBIC ACID) 500 MG tablet Take 2 tablets by mouth Daily.       No current facility-administered medications for this visit.       Raúl Britton had no medications administered during this visit.    Return in about 5 months (around 9/21/2025) for Annual physical.    There are no Patient Instructions on file for this visit.

## 2025-04-22 LAB
ALBUMIN SERPL-MCNC: 4.4 G/DL (ref 3.5–5.2)
ALBUMIN/CREAT UR: <3 MG/G CREAT (ref 0–29)
ALBUMIN/GLOB SERPL: 1.9 G/DL
ALP SERPL-CCNC: 89 U/L (ref 39–117)
ALT SERPL-CCNC: 25 U/L (ref 1–41)
AST SERPL-CCNC: 17 U/L (ref 1–40)
BILIRUB SERPL-MCNC: 0.6 MG/DL (ref 0–1.2)
BUN SERPL-MCNC: 18 MG/DL (ref 6–20)
BUN/CREAT SERPL: 22.2 (ref 7–25)
CALCIUM SERPL-MCNC: 9.8 MG/DL (ref 8.6–10.5)
CHLORIDE SERPL-SCNC: 103 MMOL/L (ref 98–107)
CHOLEST SERPL-MCNC: 161 MG/DL (ref 0–200)
CHOLEST/HDLC SERPL: 2.73 {RATIO}
CO2 SERPL-SCNC: 26.7 MMOL/L (ref 22–29)
CREAT SERPL-MCNC: 0.81 MG/DL (ref 0.76–1.27)
CREAT UR-MCNC: 98.2 MG/DL
EGFRCR SERPLBLD CKD-EPI 2021: 102.2 ML/MIN/1.73
GLOBULIN SER CALC-MCNC: 2.3 GM/DL
GLUCOSE SERPL-MCNC: 100 MG/DL (ref 65–99)
HBA1C MFR BLD: 6.7 % (ref 4.8–5.6)
HDLC SERPL-MCNC: 59 MG/DL (ref 40–60)
LDLC SERPL CALC-MCNC: 88 MG/DL (ref 0–100)
MICROALBUMIN UR-MCNC: <3 UG/ML
POTASSIUM SERPL-SCNC: 4.8 MMOL/L (ref 3.5–5.2)
PROT SERPL-MCNC: 6.7 G/DL (ref 6–8.5)
PSA SERPL-MCNC: 0.69 NG/ML (ref 0–4)
SODIUM SERPL-SCNC: 141 MMOL/L (ref 136–145)
TRIGL SERPL-MCNC: 70 MG/DL (ref 0–150)
VLDLC SERPL CALC-MCNC: 14 MG/DL (ref 5–40)